# Patient Record
Sex: MALE | Race: WHITE | NOT HISPANIC OR LATINO | Employment: FULL TIME | URBAN - METROPOLITAN AREA
[De-identification: names, ages, dates, MRNs, and addresses within clinical notes are randomized per-mention and may not be internally consistent; named-entity substitution may affect disease eponyms.]

---

## 2019-04-22 ENCOUNTER — TRANSCRIBE ORDERS (OUTPATIENT)
Dept: ADMINISTRATIVE | Facility: HOSPITAL | Age: 64
End: 2019-04-22

## 2019-04-22 DIAGNOSIS — R31.9 HEMATURIA, UNSPECIFIED TYPE: Primary | ICD-10-CM

## 2019-05-04 ENCOUNTER — HOSPITAL ENCOUNTER (OUTPATIENT)
Dept: CT IMAGING | Facility: HOSPITAL | Age: 64
Discharge: HOME | End: 2019-05-04
Attending: UROLOGY
Payer: COMMERCIAL

## 2019-05-04 DIAGNOSIS — R31.9 HEMATURIA, UNSPECIFIED TYPE: ICD-10-CM

## 2019-05-04 PROCEDURE — 74178 CT ABD&PLV WO CNTR FLWD CNTR: CPT

## 2019-05-04 RX ADMIN — IOHEXOL 100 ML: 350 INJECTION, SOLUTION INTRAVENOUS at 15:31

## 2020-04-06 ENCOUNTER — APPOINTMENT (OUTPATIENT)
Dept: URGENT CARE | Facility: CLINIC | Age: 65
End: 2020-04-06

## 2020-04-10 ENCOUNTER — APPOINTMENT (OUTPATIENT)
Dept: URGENT CARE | Facility: CLINIC | Age: 65
End: 2020-04-10
Payer: OTHER MISCELLANEOUS

## 2020-04-10 PROCEDURE — 99213 OFFICE O/P EST LOW 20 MIN: CPT

## 2021-02-13 DIAGNOSIS — Z23 ENCOUNTER FOR IMMUNIZATION: ICD-10-CM

## 2021-02-25 ENCOUNTER — IMMUNIZATIONS (OUTPATIENT)
Dept: FAMILY MEDICINE CLINIC | Facility: HOSPITAL | Age: 66
End: 2021-02-25

## 2021-02-25 DIAGNOSIS — Z23 ENCOUNTER FOR IMMUNIZATION: Primary | ICD-10-CM

## 2021-02-25 PROCEDURE — 91300 SARS-COV-2 / COVID-19 MRNA VACCINE (PFIZER-BIONTECH) 30 MCG: CPT

## 2021-02-25 PROCEDURE — 0001A SARS-COV-2 / COVID-19 MRNA VACCINE (PFIZER-BIONTECH) 30 MCG: CPT

## 2021-03-16 ENCOUNTER — IMMUNIZATIONS (OUTPATIENT)
Dept: FAMILY MEDICINE CLINIC | Facility: HOSPITAL | Age: 66
End: 2021-03-16

## 2021-03-16 DIAGNOSIS — Z23 ENCOUNTER FOR IMMUNIZATION: Primary | ICD-10-CM

## 2021-03-16 PROCEDURE — 0002A SARS-COV-2 / COVID-19 MRNA VACCINE (PFIZER-BIONTECH) 30 MCG: CPT

## 2021-03-16 PROCEDURE — 91300 SARS-COV-2 / COVID-19 MRNA VACCINE (PFIZER-BIONTECH) 30 MCG: CPT

## 2021-10-09 ENCOUNTER — IMMUNIZATIONS (OUTPATIENT)
Dept: FAMILY MEDICINE CLINIC | Facility: HOSPITAL | Age: 66
End: 2021-10-09

## 2021-10-09 DIAGNOSIS — Z23 ENCOUNTER FOR IMMUNIZATION: Primary | ICD-10-CM

## 2021-10-09 PROCEDURE — 91300 SARS-COV-2 / COVID-19 MRNA VACCINE (PFIZER-BIONTECH) 30 MCG: CPT

## 2021-10-09 PROCEDURE — 0001A SARS-COV-2 / COVID-19 MRNA VACCINE (PFIZER-BIONTECH) 30 MCG: CPT

## 2022-01-27 ENCOUNTER — TELEPHONE (OUTPATIENT)
Dept: UROLOGY | Facility: AMBULATORY SURGERY CENTER | Age: 67
End: 2022-01-27

## 2022-01-27 NOTE — TELEPHONE ENCOUNTER
New Patient called to say someone left message from office asking if he had any labs  He stated he has copy of his labs from [address] [place] [address] and can bring them to his appointment  He is not able to get in touch with office since Dr Cheyenne Davis retired

## 2022-01-27 NOTE — TELEPHONE ENCOUNTER
Contacted patient, patient stated he will bring labs and CT results at time of appt  Patient has confirmed

## 2022-02-02 ENCOUNTER — OFFICE VISIT (OUTPATIENT)
Dept: UROLOGY | Facility: CLINIC | Age: 67
End: 2022-02-02
Payer: COMMERCIAL

## 2022-02-02 VITALS
HEART RATE: 67 BPM | HEIGHT: 66 IN | BODY MASS INDEX: 34.87 KG/M2 | SYSTOLIC BLOOD PRESSURE: 138 MMHG | OXYGEN SATURATION: 98 % | WEIGHT: 217 LBS | DIASTOLIC BLOOD PRESSURE: 86 MMHG

## 2022-02-02 DIAGNOSIS — Z12.5 SCREENING FOR PROSTATE CANCER: ICD-10-CM

## 2022-02-02 DIAGNOSIS — N40.1 BENIGN PROSTATIC HYPERPLASIA WITH URINARY FREQUENCY: ICD-10-CM

## 2022-02-02 DIAGNOSIS — R35.0 BENIGN PROSTATIC HYPERPLASIA WITH URINARY FREQUENCY: ICD-10-CM

## 2022-02-02 DIAGNOSIS — N40.0 ENLARGED PROSTATE: Primary | ICD-10-CM

## 2022-02-02 LAB
BACTERIA UR QL AUTO: ABNORMAL /HPF
BILIRUB UR QL STRIP: NEGATIVE
CLARITY UR: CLEAR
COLOR UR: YELLOW
GLUCOSE UR STRIP-MCNC: NEGATIVE MG/DL
HGB UR QL STRIP.AUTO: NEGATIVE
HYALINE CASTS #/AREA URNS LPF: ABNORMAL /LPF
KETONES UR STRIP-MCNC: NEGATIVE MG/DL
LEUKOCYTE ESTERASE UR QL STRIP: ABNORMAL
NITRITE UR QL STRIP: NEGATIVE
NON-SQ EPI CELLS URNS QL MICRO: ABNORMAL /HPF
PH UR STRIP.AUTO: 6 [PH]
POST-VOID RESIDUAL VOLUME, ML POC: 147 ML
PROT UR STRIP-MCNC: NEGATIVE MG/DL
RBC #/AREA URNS AUTO: ABNORMAL /HPF
SL AMB  POCT GLUCOSE, UA: NORMAL
SL AMB LEUKOCYTE ESTERASE,UA: NORMAL
SL AMB POCT BILIRUBIN,UA: NORMAL
SL AMB POCT BLOOD,UA: NORMAL
SL AMB POCT CLARITY,UA: CLEAR
SL AMB POCT COLOR,UA: YELLOW
SL AMB POCT KETONES,UA: NORMAL
SL AMB POCT NITRITE,UA: NORMAL
SL AMB POCT PH,UA: 5
SL AMB POCT SPECIFIC GRAVITY,UA: 1.02
SL AMB POCT URINE PROTEIN: NORMAL
SL AMB POCT UROBILINOGEN: 0.2
SP GR UR STRIP.AUTO: 1.02 (ref 1–1.03)
UROBILINOGEN UR QL STRIP.AUTO: 0.2 E.U./DL
WBC #/AREA URNS AUTO: ABNORMAL /HPF

## 2022-02-02 PROCEDURE — 51798 US URINE CAPACITY MEASURE: CPT | Performed by: PHYSICIAN ASSISTANT

## 2022-02-02 PROCEDURE — 81002 URINALYSIS NONAUTO W/O SCOPE: CPT | Performed by: PHYSICIAN ASSISTANT

## 2022-02-02 PROCEDURE — 81001 URINALYSIS AUTO W/SCOPE: CPT | Performed by: PHYSICIAN ASSISTANT

## 2022-02-02 PROCEDURE — 99203 OFFICE O/P NEW LOW 30 MIN: CPT | Performed by: PHYSICIAN ASSISTANT

## 2022-02-02 PROCEDURE — 87086 URINE CULTURE/COLONY COUNT: CPT | Performed by: PHYSICIAN ASSISTANT

## 2022-02-02 RX ORDER — LISINOPRIL AND HYDROCHLOROTHIAZIDE 20; 12.5 MG/1; MG/1
TABLET ORAL
COMMUNITY
Start: 2021-12-19

## 2022-02-02 RX ORDER — TAMSULOSIN HYDROCHLORIDE 0.4 MG/1
0.4 CAPSULE ORAL
Qty: 90 CAPSULE | Refills: 3 | Status: SHIPPED | OUTPATIENT
Start: 2022-02-02

## 2022-02-02 RX ORDER — SIMVASTATIN 20 MG
TABLET ORAL
COMMUNITY
Start: 2021-12-21

## 2022-02-02 RX ORDER — MONTELUKAST SODIUM 10 MG/1
TABLET ORAL
COMMUNITY
Start: 2022-01-11

## 2022-02-02 RX ORDER — TAMSULOSIN HYDROCHLORIDE 0.4 MG/1
CAPSULE ORAL
COMMUNITY
End: 2022-02-02 | Stop reason: SDUPTHER

## 2022-02-02 RX ORDER — OMEPRAZOLE 20 MG/1
CAPSULE, DELAYED RELEASE ORAL
COMMUNITY
Start: 2021-12-21

## 2022-02-02 NOTE — PROGRESS NOTES
1  Enlarged prostate  POCT Measure PVR    POCT urine dip   2  Benign prostatic hyperplasia with urinary frequency  tamsulosin (FLOMAX) 0 4 mg    Urinalysis with microscopic    Urine culture   3  Screening for prostate cancer  PSA, Total Screen       Assessment and plan:       1  BPH with lower urinary tract symptoms  -continue tamsulosin monotherapy  -we discussed either addition of 5 alpha reductase inhibitor versus cystoscopy with transrectal ultrasound in the future should his urinary symptoms worsen  Patient reports that he is comfortable and will continue with tamsulosin and conservative measures  Follow-up 1 year for re-evaluation  2  Prostate cancer screening  -normal digital rectal examination  - will obtain an updated PSA    Codie Hood PA-C      Chief Complaint     BPH    History of Present Illness     Dayne Cash is a 77 y o  male presenting today as a new patient for transfer care BPH with lower urinary tract symptoms  Patient previously followed by Dr Jevon Mccullough  Longstanding history of BPH with lower urinary tract symptoms  Has been on tamsulosin monotherapy for period of time  Has some mild lower urinary tract symptoms overall comfortable with his urination  Denies any dysuria, gross hematuria, incontinence, or infections  CT urogram (05/04/2019) a negative for any urinary tract abnormalities  Prostatomegaly was noted  Patient denies any history of  surgical manipulation or  malignancies  Urine dip leukocyte positive, nitrite and blood negative  PVR 147mL    AUA SYMPTOM SCORE      Most Recent Value   AUA SYMPTOM SCORE    How often have you had a sensation of not emptying your bladder completely after you finished urinating? 4   How often have you had to urinate again less than two hours after you finished urinating? 4   How often have you found you stopped and started again several times when you urinate?  4   How often have you found it difficult to postpone urination? 3   How often have you had a weak urinary stream? 5   How often have you had to push or strain to begin urination? 5   How many times did you most typically get up to urinate from the time you went to bed at night until the time you got up in the morning? 3   Quality of Life: If you were to spend the rest of your life with your urinary condition just the way it is now, how would you feel about that? 5   AUA SYMPTOM SCORE 28          Review of Systems     Review of Systems   Constitutional: Negative for activity change, appetite change, chills, diaphoresis, fatigue, fever and unexpected weight change  Respiratory: Negative for chest tightness and shortness of breath  Cardiovascular: Negative for chest pain, palpitations and leg swelling  Gastrointestinal: Negative for abdominal distention, abdominal pain, constipation, diarrhea, nausea and vomiting  Genitourinary: Negative for decreased urine volume, difficulty urinating, dysuria, enuresis, flank pain, frequency, genital sores, hematuria and urgency  Musculoskeletal: Negative for back pain, gait problem and myalgias  Skin: Negative for color change, pallor, rash and wound  Psychiatric/Behavioral: Negative for behavioral problems  The patient is not nervous/anxious  Allergies     No Known Allergies    Physical Exam     Physical Exam  Constitutional:       General: He is not in acute distress  Appearance: Normal appearance  He is normal weight  He is not ill-appearing, toxic-appearing or diaphoretic  HENT:      Head: Normocephalic and atraumatic  Eyes:      General:         Right eye: No discharge  Left eye: No discharge  Conjunctiva/sclera: Conjunctivae normal    Pulmonary:      Effort: Pulmonary effort is normal  No respiratory distress  Genitourinary:     Comments: Good rectal tone  Prostate 40g, smooth, symmetric, no palpable nodules  Musculoskeletal:         General: No swelling or tenderness   Normal range of motion  Skin:     General: Skin is warm and dry  Coloration: Skin is not jaundiced or pale  Neurological:      General: No focal deficit present  Mental Status: He is alert and oriented to person, place, and time  Psychiatric:         Mood and Affect: Mood normal          Behavior: Behavior normal          Thought Content: Thought content normal          Judgment: Judgment normal            Vital Signs     Vitals:    02/02/22 1002   BP: 138/86   Pulse: 67   SpO2: 98%   Weight: 98 4 kg (217 lb)   Height: 5' 6" (1 676 m)         Current Medications       Current Outpatient Medications:     lisinopril-hydrochlorothiazide (PRINZIDE,ZESTORETIC) 20-12 5 MG per tablet, , Disp: , Rfl:     montelukast (SINGULAIR) 10 mg tablet, , Disp: , Rfl:     omeprazole (PriLOSEC) 20 mg delayed release capsule, , Disp: , Rfl:     simvastatin (ZOCOR) 20 mg tablet, , Disp: , Rfl:     tamsulosin (FLOMAX) 0 4 mg, Take 1 capsule (0 4 mg total) by mouth daily with dinner, Disp: 90 capsule, Rfl: 3      Active Problems     There is no problem list on file for this patient  Past Medical History     Past Medical History:   Diagnosis Date    Hypertension     No pertinent past medical history          Surgical History     Past Surgical History:   Procedure Laterality Date    NO PAST SURGERIES      TONSILLECTOMY      when pt was 15yo         Family History     History reviewed  No pertinent family history        Social History     Social History       Radiology

## 2022-02-03 LAB — BACTERIA UR CULT: NORMAL

## 2022-02-15 ENCOUNTER — APPOINTMENT (OUTPATIENT)
Dept: LAB | Facility: AMBULARY SURGERY CENTER | Age: 67
End: 2022-02-15
Payer: COMMERCIAL

## 2022-02-15 DIAGNOSIS — Z12.5 SCREENING FOR PROSTATE CANCER: ICD-10-CM

## 2022-02-15 LAB — PSA SERPL-MCNC: 1.1 NG/ML (ref 0–4)

## 2022-02-15 PROCEDURE — G0103 PSA SCREENING: HCPCS

## 2022-02-15 PROCEDURE — 36415 COLL VENOUS BLD VENIPUNCTURE: CPT

## 2022-07-05 ENCOUNTER — OFFICE VISIT (OUTPATIENT)
Dept: UROLOGY | Facility: CLINIC | Age: 67
End: 2022-07-05
Payer: COMMERCIAL

## 2022-07-05 VITALS
WEIGHT: 198 LBS | SYSTOLIC BLOOD PRESSURE: 110 MMHG | HEART RATE: 82 BPM | DIASTOLIC BLOOD PRESSURE: 70 MMHG | HEIGHT: 66 IN | BODY MASS INDEX: 31.82 KG/M2 | OXYGEN SATURATION: 97 %

## 2022-07-05 DIAGNOSIS — N40.1 BENIGN PROSTATIC HYPERPLASIA WITH LOWER URINARY TRACT SYMPTOMS, SYMPTOM DETAILS UNSPECIFIED: Primary | ICD-10-CM

## 2022-07-05 PROCEDURE — 99213 OFFICE O/P EST LOW 20 MIN: CPT | Performed by: PHYSICIAN ASSISTANT

## 2022-07-05 NOTE — PROGRESS NOTES
No diagnosis found  Assessment and plan:       1  BPH with lower urinary tract symptoms  -continue tamsulosin monotherapy  - patient is interested in surgical workup of his lower urinary tract symptoms as he wishes for intervention prior route to correction  Will follow up for cystoscopy and transrectal ultrasound for bladder outlet obstruction workup  2  Prostate cancer screening  -normal digital rectal examination  - PSA 1 1 (2/15/22)    Vanda Monzon PA-C      Chief Complaint     BPH    History of Present Illness     Chanel Bruce is a 79 y o  male presenting today for f/u BPH with lower urinary tract symptoms  Patient previously followed by Dr Jai Ashby  Longstanding history of BPH with lower urinary tract symptoms  Has been on tamsulosin monotherapy for period of time  Has some mild lower urinary tract symptoms overall comfortable with his urination  Denies any dysuria, gross hematuria, incontinence, or infections  CT urogram (05/04/2019) a negative for any urinary tract abnormalities  Prostatomegaly was noted  Patient denies any history of  surgical manipulation or  malignancies  PSA 1 1 (2/15/22)    AUA SYMPTOM SCORE    Flowsheet Row Most Recent Value   AUA SYMPTOM SCORE    How often have you had a sensation of not emptying your bladder completely after you finished urinating? 0   How often have you had to urinate again less than two hours after you finished urinating? 2   How often have you found you stopped and started again several times when you urinate? 0   How often have you found it difficult to postpone urination? 0   How often have you had a weak urinary stream? 0   How often have you had to push or strain to begin urination? 0   How many times did you most typically get up to urinate from the time you went to bed at night until the time you got up in the morning?  2   Quality of Life: If you were to spend the rest of your life with your urinary condition just the way it is now, how would you feel about that? 2   AUA SYMPTOM SCORE 4            Review of Systems     Review of Systems   Constitutional: Negative for activity change, appetite change, chills, diaphoresis, fatigue, fever and unexpected weight change  Respiratory: Negative for chest tightness and shortness of breath  Cardiovascular: Negative for chest pain, palpitations and leg swelling  Gastrointestinal: Negative for abdominal distention, abdominal pain, constipation, diarrhea, nausea and vomiting  Genitourinary: Negative for decreased urine volume, difficulty urinating, dysuria, enuresis, flank pain, frequency, genital sores, hematuria and urgency  Musculoskeletal: Negative for back pain, gait problem and myalgias  Skin: Negative for color change, pallor, rash and wound  Psychiatric/Behavioral: Negative for behavioral problems  The patient is not nervous/anxious  Allergies     No Known Allergies    Physical Exam     Physical Exam  Constitutional:       General: He is not in acute distress  Appearance: Normal appearance  He is normal weight  He is not ill-appearing, toxic-appearing or diaphoretic  HENT:      Head: Normocephalic and atraumatic  Eyes:      General:         Right eye: No discharge  Left eye: No discharge  Conjunctiva/sclera: Conjunctivae normal    Pulmonary:      Effort: Pulmonary effort is normal  No respiratory distress  Genitourinary:     Comments: Good rectal tone  Prostate 40g, smooth, symmetric, no palpable nodules  Musculoskeletal:         General: No swelling or tenderness  Normal range of motion  Skin:     General: Skin is warm and dry  Coloration: Skin is not jaundiced or pale  Neurological:      General: No focal deficit present  Mental Status: He is alert and oriented to person, place, and time  Psychiatric:         Mood and Affect: Mood normal          Behavior: Behavior normal          Thought Content:  Thought content normal  Judgment: Judgment normal            Vital Signs     Vitals:    07/05/22 0721   BP: 110/70   BP Location: Left arm   Patient Position: Sitting   Cuff Size: Standard   Pulse: 82   SpO2: 97%   Weight: 89 8 kg (198 lb)   Height: 5' 6" (1 676 m)         Current Medications       Current Outpatient Medications:     lisinopril-hydrochlorothiazide (PRINZIDE,ZESTORETIC) 20-12 5 MG per tablet, , Disp: , Rfl:     montelukast (SINGULAIR) 10 mg tablet, , Disp: , Rfl:     omeprazole (PriLOSEC) 20 mg delayed release capsule, , Disp: , Rfl:     simvastatin (ZOCOR) 20 mg tablet, , Disp: , Rfl:     tamsulosin (FLOMAX) 0 4 mg, Take 1 capsule (0 4 mg total) by mouth daily with dinner, Disp: 90 capsule, Rfl: 3      Active Problems     There is no problem list on file for this patient  Past Medical History     Past Medical History:   Diagnosis Date    Hypertension     No pertinent past medical history          Surgical History     Past Surgical History:   Procedure Laterality Date    NO PAST SURGERIES      TONSILLECTOMY      when pt was 17yo         Family History     History reviewed  No pertinent family history        Social History     Social History       Radiology

## 2023-01-04 PROBLEM — N40.1 BENIGN PROSTATIC HYPERPLASIA WITH LOWER URINARY TRACT SYMPTOMS: Status: ACTIVE | Noted: 2023-01-04

## 2023-01-04 NOTE — PROGRESS NOTES
Cystoscopy     Date/Time 1/4/2023 8:29 AM     Performed by  Nina López MD     Authorized by Nina López MD          Procedure Details:  Procedure type: cystoscopy       Office Cystoscopy Procedure Note    Indication:    Medically refractory lower urinary tract symptoms    Informed consent   The risks, benefits, complications, treatment options, and expected outcomes were discussed with the patient  The patient concurred with the proposed plan and provided informed consent  Anesthesia  Lidocaine jelly 2%    Procedure  The patient was placed in the supineposition, was prepped and draped in the usual manner using sterile technique, and 2% lidocaine jelly instilled into the urethra  A 17 F flexible cystoscope was then inserted into the urethra and the urethra and bladder carefully examined  The following findings were noted:    Findings:  Urethra:  Normal  Prostate:  Pure by lobar hyperplasia  Prostatic fossa is high and moderate in length  However the bladder neck is not elevated there is no median lobe  Anatomy would be highly amenable to the UroLift  Bladder:  Normal  Ureteral orifices:  Normal  Other findings:  None       Office TRUS    Indication    Prostate volumetrics for surgical planning    Transrectal ultrasonography  After completing the cystoscopy, the patient was placed in the left lateral decubitus position  After an attentive digital rectal examination, a 7 5 mHz sidefire ultrasound probe was gently inserted into the rectum and biplanar imaging of the prostate was done with the findings noted below  Images were taken of any abnormal findings and also to document prostate size      Bladder  The bladder base appeared normal     Prostate      Ultrasound size measurements:  -Volume:  54 cm3    Ultrasound findings:  -Cysts: None  -Masses: None  -Median lobe: absent        Specimens:  sterile urine culture collected through cystoscope                 Complications:    None; patient tolerated the procedure well           Disposition: To home after 30 minute observation             Condition: Stable

## 2023-01-05 ENCOUNTER — PROCEDURE VISIT (OUTPATIENT)
Dept: UROLOGY | Facility: CLINIC | Age: 68
End: 2023-01-05

## 2023-01-05 VITALS
HEIGHT: 66 IN | BODY MASS INDEX: 33.14 KG/M2 | HEART RATE: 80 BPM | WEIGHT: 206.2 LBS | DIASTOLIC BLOOD PRESSURE: 82 MMHG | RESPIRATION RATE: 16 BRPM | SYSTOLIC BLOOD PRESSURE: 140 MMHG | OXYGEN SATURATION: 98 %

## 2023-01-05 DIAGNOSIS — N40.1 BENIGN PROSTATIC HYPERPLASIA WITH LOWER URINARY TRACT SYMPTOMS, SYMPTOM DETAILS UNSPECIFIED: Primary | ICD-10-CM

## 2023-01-05 DIAGNOSIS — R35.0 BENIGN PROSTATIC HYPERPLASIA WITH URINARY FREQUENCY: ICD-10-CM

## 2023-01-05 DIAGNOSIS — N40.1 BENIGN PROSTATIC HYPERPLASIA WITH URINARY FREQUENCY: ICD-10-CM

## 2023-01-05 LAB — POST-VOID RESIDUAL VOLUME, ML POC: 157 ML

## 2023-01-05 RX ORDER — TAMSULOSIN HYDROCHLORIDE 0.4 MG/1
0.4 CAPSULE ORAL
Qty: 90 CAPSULE | Refills: 3 | Status: SHIPPED | OUTPATIENT
Start: 2023-01-05

## 2023-01-05 RX ORDER — LISINOPRIL AND HYDROCHLOROTHIAZIDE 20; 12.5 MG/1; MG/1
TABLET ORAL
COMMUNITY

## 2023-01-05 RX ORDER — SIMVASTATIN 20 MG
TABLET ORAL
COMMUNITY

## 2023-01-05 NOTE — PROGRESS NOTES
Referring Physician: Blanca Xie MD  A copy of this note was sent to the referring physician  Diagnoses and all orders for this visit:    Benign prostatic hyperplasia with lower urinary tract symptoms, symptom details unspecified  -     Cystoscopy  -     POCT Measure PVR  -     Urine culture  -     Case request operating room: [address] [place] [address]; Standing  -     Case request operating room: CYSTOSCOPY WITH INSERTION UROLIFT    Benign prostatic hyperplasia with urinary frequency  -     tamsulosin (FLOMAX) 0 4 mg; Take 1 capsule (0 4 mg total) by mouth daily with dinner    Other orders  -     simvastatin (ZOCOR) 20 mg tablet;  (Patient not taking: Reported on 1/5/2023)  -     lisinopril-hydrochlorothiazide (PRINZIDE,ZESTORETIC) 20-12 5 MG per tablet;  (Patient not taking: Reported on 1/5/2023)  -     Diet NPO; Sips with meds; Standing  -     Place sequential compression device; Standing  -     ceFAZolin (ANCEF) 2,000 mg in dextrose 5 % 100 mL IVPB            Assessment and plan:       1  Refractory lower urinary tract symptoms  -Managed with tamsulosin for some time  -    2  Prostate Cancer screening  -normal digital rectal examination  - PSA 1 1 (2/15/22)    Cystoscopic findings reviewed with the patient in detail  We reviewed the options for treating BPH/LUTS which include but are not limited to expectant management, medical therapy, transurethral resection of prostate (TURP)  We also discussed minimally invasive options  Compared and contrasted the differential effectiveness in terms of AUA symptom score, symptom complex improvement, as well as the data regarding longevity of each surgical option  We also discussed the differential recovery time between each modality  At this point, the patient wishes to proceed with Urolift    This is a great option for the patient based on the following criteria:    - cystoscopy revealed bilobar hyperplasia  - estimated gland volume 54 g measured by transrectal ultrasound  - uroflow with max flow 4 mL/s  - PVR with 157  - AUA SS 5  - Bother index: 2  - normal renal function  - no active urinary tract infection  - PSA: 1 1  - no documented allergy to nickel    - He has failed the following treatment options: Tamsulosin  The risks of Urolift include but are not limited to bleeding, infection, reaction to anesthesia such as heart attack, stroke, DVT/PE, hyponatremia, bladder neck contracture, urethral stricture, injury to surrounding structures (ureters, rectum, etc), complications from implants including capsular tap perforation, development of bladder calculi  We discussed that additional risks of trans urethral resection procedures such as incontinence, retrograde ejaculation, and erectile dysfunction have been only rarely reported with the Uro lift procedure  We did discuss that this is a new were procedure and a permanent implant  We discussed that there may be some long-term implications that her on for seen with this newer technology, such as perhaps complicating treatment for prostate cancer if indicated down the line  Finally, I told him that he may require additional procedures secondary to some of these complications  Informed consent was obtained for the Uro lift procedure  This will be scheduled in the near future to be performed under IV sedation  Claus Chambers MD      Chief Complaint     BPH work-up      History of Present Illness     Richie Menjivar is a 79 y o  is a follow-up for BPH work-up  He is motivated to explore alternatives to ongoing tamsulosin for his BPH    Detailed Urologic History     - please refer to HPI    Review of Systems     Review of Systems   Constitutional: Negative for activity change and fatigue  HENT: Negative for congestion  Eyes: Negative for visual disturbance  Respiratory: Negative for shortness of breath and wheezing  Cardiovascular: Negative for chest pain and leg swelling  Gastrointestinal: Negative for abdominal pain  Endocrine: Negative for polyuria  Genitourinary: Negative for dysuria, flank pain, hematuria and urgency  Musculoskeletal: Negative for back pain  Allergic/Immunologic: Negative for immunocompromised state  Neurological: Negative for dizziness and numbness  Psychiatric/Behavioral: Negative for dysphoric mood  All other systems reviewed and are negative  AUA SYMPTOM SCORE    Flowsheet Row Most Recent Value   AUA SYMPTOM SCORE    How often have you had a sensation of not emptying your bladder completely after you finished urinating? 1 (P)     How often have you had to urinate again less than two hours after you finished urinating? 1 (P)     How often have you found you stopped and started again several times when you urinate? 0 (P)     How often have you found it difficult to postpone urination? 0 (P)     How often have you had a weak urinary stream? 1 (P)     How often have you had to push or strain to begin urination? 0 (P)     How many times did you most typically get up to urinate from the time you went to bed at night until the time you got up in the morning? 2 (P)     Quality of Life: If you were to spend the rest of your life with your urinary condition just the way it is now, how would you feel about that? 2 (P)     AUA SYMPTOM SCORE 5 (P)             Allergies     No Known Allergies    Physical Exam       Physical Exam  Constitutional:       General: He is not in acute distress  Appearance: He is well-developed  HENT:      Head: Normocephalic and atraumatic  Cardiovascular:      Comments: Negative lower extremity edema  Pulmonary:      Effort: Pulmonary effort is normal       Breath sounds: Normal breath sounds  Abdominal:      Palpations: Abdomen is soft  Musculoskeletal:         General: Normal range of motion  Cervical back: Normal range of motion  Skin:     General: Skin is warm     Neurological:      Mental Status: He is alert and oriented to person, place, and time  Psychiatric:         Behavior: Behavior normal            Vital Signs  There were no vitals filed for this visit  Current Medications       Current Outpatient Medications:   •  lisinopril-hydrochlorothiazide (PRINZIDE,ZESTORETIC) 20-12 5 MG per tablet, , Disp: , Rfl:   •  montelukast (SINGULAIR) 10 mg tablet, , Disp: , Rfl:   •  omeprazole (PriLOSEC) 20 mg delayed release capsule, , Disp: , Rfl:   •  simvastatin (ZOCOR) 20 mg tablet, , Disp: , Rfl:   •  tamsulosin (FLOMAX) 0 4 mg, Take 1 capsule (0 4 mg total) by mouth daily with dinner, Disp: 90 capsule, Rfl: 3      Active Problems     Patient Active Problem List   Diagnosis   • Benign prostatic hyperplasia with lower urinary tract symptoms         Past Medical History     Past Medical History:   Diagnosis Date   • Hypertension    • No pertinent past medical history          Surgical History     Past Surgical History:   Procedure Laterality Date   • NO PAST SURGERIES     • TONSILLECTOMY      when pt was 17yo         Family History     No family history on file  Social History     Social History     Social History     Tobacco Use   Smoking Status Former   Smokeless Tobacco Never   Tobacco Comments    quit 30 years ago          Pertinent Lab Values     No results found for: CREATININE    Lab Results   Component Value Date    PSA 1 1 02/15/2022       @RESULTRCNT(1H])@      Pertinent Imaging      - n/a    Portions of the record may have been created with voice recognition software  Occasional wrong word or "sound a like" substitutions may have occurred due to the inherent limitations of voice recognition software  Read the chart carefully and recognize, using context, where substitutions have occurred

## 2023-01-06 LAB — BACTERIA UR CULT: NORMAL

## 2023-01-10 ENCOUNTER — TELEPHONE (OUTPATIENT)
Dept: UROLOGY | Facility: CLINIC | Age: 68
End: 2023-01-10

## 2023-01-11 ENCOUNTER — PREP FOR PROCEDURE (OUTPATIENT)
Dept: UROLOGY | Facility: CLINIC | Age: 68
End: 2023-01-11

## 2023-01-11 DIAGNOSIS — N40.1 BENIGN PROSTATIC HYPERPLASIA WITH LOWER URINARY TRACT SYMPTOMS, SYMPTOM DETAILS UNSPECIFIED: Primary | ICD-10-CM

## 2023-01-11 NOTE — TELEPHONE ENCOUNTER
Spoke w patients wife and he is sched for 2/24 at the [facility]  She is aware she will drive him and hospital will contact them day prior w time of arrival  He will go for UCX and EKG 2-3 wks prior and advised 7 day hold of asprin products, multivitamins and fish oils  We will mail them a surgical packet and they will contact us w any questions or concerns

## 2023-01-16 ENCOUNTER — TELEPHONE (OUTPATIENT)
Dept: OTHER | Facility: OTHER | Age: 68
End: 2023-01-16

## 2023-01-16 ENCOUNTER — NURSE TRIAGE (OUTPATIENT)
Dept: OTHER | Facility: OTHER | Age: 68
End: 2023-01-16

## 2023-01-16 DIAGNOSIS — N40.1 BPH WITH OBSTRUCTION/LOWER URINARY TRACT SYMPTOMS: Primary | ICD-10-CM

## 2023-01-16 DIAGNOSIS — N13.8 BPH WITH OBSTRUCTION/LOWER URINARY TRACT SYMPTOMS: Primary | ICD-10-CM

## 2023-01-16 NOTE — TELEPHONE ENCOUNTER
Patient called in to report hesitancy and pain to start stream; has a weak stream, and he doesn't feel he is emptying his bladder  Patient called to request increasing Tamsulosin to two capsules per day as advised by previous provider before custodial  Patient scheduled for procedure 2/24/2023  Please follow up with patient after 10 AM    Reason for Disposition  • Caller has NON-URGENT medicine question about med that PCP or specialist prescribed and triager unable to answer question    Answer Assessment - Initial Assessment Questions  1  NAME of MEDICATION: "What medicine are you calling about?"      Tamsulosin 0 4 mg PO daily  2  QUESTION: "What is your question?" (e g , medication refill, side effect)      More difficulty urinating for the past week;  3  PRESCRIBING HCP: "Who prescribed it?" Reason: if prescribed by specialist, call should be referred to that group  Urology  4  SYMPTOMS: "Do you have any symptoms?"      Difficulty and painful to start his stream; straining; weak stream and doesn't feel he is emptying his bladder  5   SEVERITY: If symptoms are present, ask "Are they mild, moderate or severe?"      moderate    Protocols used: MEDICATION QUESTION CALL-ADULT-[place]

## 2023-01-16 NOTE — TELEPHONE ENCOUNTER
Regarding: Urology medication question  ----- Message from Dari Mansfield RN sent at 1/16/2023  5:05 PM EST -----  "I am taking flomax; I was told that I could take 2 tablets but have only been taking one  It was working well, but now for the past week I have had more difficulty urinating   Should I take another tablet?"

## 2023-01-17 NOTE — TELEPHONE ENCOUNTER
Returned call to patient  was last in our office on 1/5/23 for cystoscopy and is scheduled  for urolift on 2/24/23 with Dr Gilbert Garcias   Patient reports and started with difficulty with urination  + frequency, urgency no burning  Patient states , has to push to void  once he starts stream is okay  Orders place for urine testing  Patient will go today  Offered a nurse visit for pvr patient state he does not feel he is at that point yet  Advised to contact our office if symptoms worsen  Patient verbalized understanding       Patient is also requesting to double his flomax dose to 0 8 was advised per patient to do so in the past     Will send message to provider

## 2023-01-17 NOTE — TELEPHONE ENCOUNTER
Spoke with patient and relayed provider's message  He verbalized understanding and agrees to plan  Office to monitor for urine results

## 2023-01-17 NOTE — TELEPHONE ENCOUNTER
Elvi Butler PA-C  [facility] 2 hours ago (8:15 AM)     BU  Agree with urine testing and PVR if symptoms worsen   Can trial double dose Flomax as he has done this in the past   If he develops side effects of medication, would recommend decreasing to single dose Flomax   Thank you      Left message for patient requesting call back

## 2023-01-18 ENCOUNTER — APPOINTMENT (OUTPATIENT)
Dept: LAB | Facility: AMBULARY SURGERY CENTER | Age: 68
End: 2023-01-18
Attending: UROLOGY

## 2023-01-18 DIAGNOSIS — N40.1 BPH WITH OBSTRUCTION/LOWER URINARY TRACT SYMPTOMS: ICD-10-CM

## 2023-01-18 DIAGNOSIS — N13.8 BPH WITH OBSTRUCTION/LOWER URINARY TRACT SYMPTOMS: ICD-10-CM

## 2023-01-18 LAB
BACTERIA UR QL AUTO: ABNORMAL /HPF
BILIRUB UR QL STRIP: NEGATIVE
CLARITY UR: CLEAR
COLOR UR: ABNORMAL
GLUCOSE UR STRIP-MCNC: NEGATIVE MG/DL
HGB UR QL STRIP.AUTO: NEGATIVE
KETONES UR STRIP-MCNC: NEGATIVE MG/DL
LEUKOCYTE ESTERASE UR QL STRIP: ABNORMAL
MUCOUS THREADS UR QL AUTO: ABNORMAL
NITRITE UR QL STRIP: NEGATIVE
NON-SQ EPI CELLS URNS QL MICRO: ABNORMAL /HPF
PH UR STRIP.AUTO: 6 [PH]
PROT UR STRIP-MCNC: ABNORMAL MG/DL
RBC #/AREA URNS AUTO: ABNORMAL /HPF
SP GR UR STRIP.AUTO: 1.02 (ref 1–1.03)
UROBILINOGEN UR STRIP-ACNC: <2 MG/DL
WBC #/AREA URNS AUTO: ABNORMAL /HPF

## 2023-01-20 DIAGNOSIS — N39.0 URINARY TRACT INFECTION WITHOUT HEMATURIA, SITE UNSPECIFIED: Primary | ICD-10-CM

## 2023-01-20 LAB — BACTERIA UR CULT: NORMAL

## 2023-01-20 RX ORDER — SULFAMETHOXAZOLE AND TRIMETHOPRIM 800; 160 MG/1; MG/1
1 TABLET ORAL EVERY 12 HOURS SCHEDULED
Qty: 6 TABLET | Refills: 0 | Status: SHIPPED | OUTPATIENT
Start: 2023-01-20 | End: 2023-01-23

## 2023-02-02 ENCOUNTER — TELEPHONE (OUTPATIENT)
Dept: UROLOGY | Facility: MEDICAL CENTER | Age: 68
End: 2023-02-02

## 2023-02-02 NOTE — TELEPHONE ENCOUNTER
DOS 23 Procedure 89057/12755// no auth required M#[id number]/ no auth required formerly [facility]#[id number]   And  no auth required ref# [id number]

## 2023-02-09 ENCOUNTER — APPOINTMENT (OUTPATIENT)
Dept: LAB | Facility: AMBULARY SURGERY CENTER | Age: 68
End: 2023-02-09
Attending: UROLOGY

## 2023-02-09 ENCOUNTER — OFFICE VISIT (OUTPATIENT)
Dept: LAB | Facility: CLINIC | Age: 68
End: 2023-02-09

## 2023-02-09 DIAGNOSIS — N40.1 BENIGN PROSTATIC HYPERPLASIA WITH LOWER URINARY TRACT SYMPTOMS, SYMPTOM DETAILS UNSPECIFIED: ICD-10-CM

## 2023-02-09 LAB
ATRIAL RATE: 73 BPM
P AXIS: 50 DEGREES
PR INTERVAL: 144 MS
QRS AXIS: 9 DEGREES
QRSD INTERVAL: 82 MS
QT INTERVAL: 364 MS
QTC INTERVAL: 401 MS
T WAVE AXIS: 27 DEGREES
VENTRICULAR RATE: 73 BPM

## 2023-02-12 ENCOUNTER — TELEPHONE (OUTPATIENT)
Dept: UROLOGY | Facility: CLINIC | Age: 68
End: 2023-02-12

## 2023-02-12 DIAGNOSIS — N39.0 URINARY TRACT INFECTION WITHOUT HEMATURIA, SITE UNSPECIFIED: Primary | ICD-10-CM

## 2023-02-12 LAB — BACTERIA UR CULT: ABNORMAL

## 2023-02-12 RX ORDER — CEPHALEXIN 500 MG/1
500 CAPSULE ORAL EVERY 12 HOURS SCHEDULED
Qty: 10 CAPSULE | Refills: 0 | Status: SHIPPED | OUTPATIENT
Start: 2023-02-12 | End: 2023-02-13 | Stop reason: ALTCHOICE

## 2023-02-13 RX ORDER — LEVOFLOXACIN 500 MG/1
500 TABLET, FILM COATED ORAL EVERY 24 HOURS
Qty: 7 TABLET | Refills: 0 | Status: SHIPPED | OUTPATIENT
Start: 2023-02-13 | End: 2023-02-21

## 2023-02-13 NOTE — TELEPHONE ENCOUNTER
Called and left VM for patient  Office number was left in VM  When patient calls back please advise levaquin abx was sent to pharmacy  Patient to stop taking keflex if that was picked up and to start levaquin  Thank you

## 2023-02-14 ENCOUNTER — ANESTHESIA EVENT (OUTPATIENT)
Dept: PERIOP | Facility: AMBULARY SURGERY CENTER | Age: 68
End: 2023-02-14

## 2023-02-14 NOTE — TELEPHONE ENCOUNTER
Spoke with patient  Already picked up levaquin and is taking only that, not the keflex   No further questions

## 2023-02-21 NOTE — PRE-PROCEDURE INSTRUCTIONS
Pre-Surgery Instructions:   Medication Instructions   • lisinopril-hydrochlorothiazide (PRINZIDE,ZESTORETIC) 20-12 5 MG per tablet Hold day of surgery  • omeprazole (PriLOSEC) 20 mg delayed release capsule Take day of surgery  • simvastatin (ZOCOR) 20 mg tablet Take night before surgery   • tamsulosin (FLOMAX) 0 4 mg Take night before surgery    Med list reviewed as above  Also instructed pt not to start any new vitamins/supplements preoperatively and to avoid NSAID's  3 days prior to surgery  Tylenol is acceptable if needed  Pt has and/or is getting CHG surgical soap and verbalizes understanding of preoperative showering protocol  All information within "My Surgical Experience" pamphlet reviewed and patient verbalizes understanding and compliance  All questions answered  Instructed pt to call surgeon's office with any questions, new illness, or hospitalization prior to surgery  Pt verbalized understanding

## 2023-02-21 NOTE — TELEPHONE ENCOUNTER
Patient wants to know if he needs to have another urine culture test done prior to his procedure on 2/24/23

## 2023-02-24 ENCOUNTER — TELEPHONE (OUTPATIENT)
Dept: UROLOGY | Facility: CLINIC | Age: 68
End: 2023-02-24

## 2023-02-24 ENCOUNTER — ANESTHESIA (OUTPATIENT)
Dept: PERIOP | Facility: AMBULARY SURGERY CENTER | Age: 68
End: 2023-02-24

## 2023-02-24 ENCOUNTER — HOSPITAL ENCOUNTER (OUTPATIENT)
Facility: AMBULARY SURGERY CENTER | Age: 68
Setting detail: OUTPATIENT SURGERY
Discharge: HOME | End: 2023-02-24
Attending: UROLOGY | Admitting: UROLOGY

## 2023-02-24 VITALS
OXYGEN SATURATION: 98 % | HEIGHT: 66 IN | SYSTOLIC BLOOD PRESSURE: 129 MMHG | HEART RATE: 74 BPM | WEIGHT: 197 LBS | DIASTOLIC BLOOD PRESSURE: 75 MMHG | RESPIRATION RATE: 16 BRPM | TEMPERATURE: 97.2 F | BODY MASS INDEX: 31.66 KG/M2

## 2023-02-24 DIAGNOSIS — N40.1 BENIGN PROSTATIC HYPERPLASIA WITH LOWER URINARY TRACT SYMPTOMS, SYMPTOM DETAILS UNSPECIFIED: Primary | ICD-10-CM

## 2023-02-24 DEVICE — IMPLANT CARTRIDGE UROLIFT 2 HANDLE KIT: Type: IMPLANTABLE DEVICE | Site: PROSTATE | Status: FUNCTIONAL

## 2023-02-24 DEVICE — IMPLANT CARTRIDGE UROLIFT 2: Type: IMPLANTABLE DEVICE | Site: PROSTATE | Status: FUNCTIONAL

## 2023-02-24 DEVICE — IMPLANT UROLIFT ATC SYSTEM: Type: IMPLANTABLE DEVICE | Site: PROSTATE | Status: FUNCTIONAL

## 2023-02-24 RX ORDER — LIDOCAINE HYDROCHLORIDE 10 MG/ML
INJECTION, SOLUTION EPIDURAL; INFILTRATION; INTRACAUDAL; PERINEURAL AS NEEDED
Status: DISCONTINUED | OUTPATIENT
Start: 2023-02-24 | End: 2023-02-24

## 2023-02-24 RX ORDER — FUROSEMIDE 10 MG/ML
INJECTION INTRAMUSCULAR; INTRAVENOUS AS NEEDED
Status: DISCONTINUED | OUTPATIENT
Start: 2023-02-24 | End: 2023-02-24

## 2023-02-24 RX ORDER — PROPOFOL 10 MG/ML
INJECTION, EMULSION INTRAVENOUS AS NEEDED
Status: DISCONTINUED | OUTPATIENT
Start: 2023-02-24 | End: 2023-02-24

## 2023-02-24 RX ORDER — FENTANYL CITRATE 50 UG/ML
INJECTION, SOLUTION INTRAMUSCULAR; INTRAVENOUS AS NEEDED
Status: DISCONTINUED | OUTPATIENT
Start: 2023-02-24 | End: 2023-02-24

## 2023-02-24 RX ORDER — PHENAZOPYRIDINE HYDROCHLORIDE 200 MG/1
200 TABLET, FILM COATED ORAL 3 TIMES DAILY PRN
Qty: 10 TABLET | Refills: 0 | Status: SHIPPED | OUTPATIENT
Start: 2023-02-24 | End: 2023-02-27

## 2023-02-24 RX ORDER — PROPOFOL 10 MG/ML
INJECTION, EMULSION INTRAVENOUS CONTINUOUS PRN
Status: DISCONTINUED | OUTPATIENT
Start: 2023-02-24 | End: 2023-02-24

## 2023-02-24 RX ORDER — ONDANSETRON 2 MG/ML
4 INJECTION INTRAMUSCULAR; INTRAVENOUS ONCE AS NEEDED
Status: DISCONTINUED | OUTPATIENT
Start: 2023-02-24 | End: 2023-02-24 | Stop reason: HOSPADM

## 2023-02-24 RX ORDER — MIDAZOLAM HYDROCHLORIDE 2 MG/2ML
INJECTION, SOLUTION INTRAMUSCULAR; INTRAVENOUS AS NEEDED
Status: DISCONTINUED | OUTPATIENT
Start: 2023-02-24 | End: 2023-02-24

## 2023-02-24 RX ORDER — CEFAZOLIN SODIUM 2 G/50ML
2000 SOLUTION INTRAVENOUS ONCE
Status: COMPLETED | OUTPATIENT
Start: 2023-02-24 | End: 2023-02-24

## 2023-02-24 RX ORDER — DOCUSATE SODIUM 100 MG/1
100 CAPSULE, LIQUID FILLED ORAL 2 TIMES DAILY
Qty: 30 CAPSULE | Refills: 0 | Status: SHIPPED | OUTPATIENT
Start: 2023-02-24 | End: 2023-03-11

## 2023-02-24 RX ORDER — SODIUM CHLORIDE, SODIUM LACTATE, POTASSIUM CHLORIDE, CALCIUM CHLORIDE 600; 310; 30; 20 MG/100ML; MG/100ML; MG/100ML; MG/100ML
INJECTION, SOLUTION INTRAVENOUS CONTINUOUS PRN
Status: DISCONTINUED | OUTPATIENT
Start: 2023-02-24 | End: 2023-02-24

## 2023-02-24 RX ORDER — SODIUM CHLORIDE, SODIUM LACTATE, POTASSIUM CHLORIDE, CALCIUM CHLORIDE 600; 310; 30; 20 MG/100ML; MG/100ML; MG/100ML; MG/100ML
75 INJECTION, SOLUTION INTRAVENOUS CONTINUOUS
Status: DISCONTINUED | OUTPATIENT
Start: 2023-02-24 | End: 2023-02-24 | Stop reason: HOSPADM

## 2023-02-24 RX ORDER — MAGNESIUM HYDROXIDE 1200 MG/15ML
LIQUID ORAL AS NEEDED
Status: DISCONTINUED | OUTPATIENT
Start: 2023-02-24 | End: 2023-02-24 | Stop reason: HOSPADM

## 2023-02-24 RX ORDER — OXYCODONE HYDROCHLORIDE 5 MG/1
5 TABLET ORAL EVERY 4 HOURS PRN
Status: DISCONTINUED | OUTPATIENT
Start: 2023-02-24 | End: 2023-02-24 | Stop reason: HOSPADM

## 2023-02-24 RX ORDER — CEPHALEXIN 500 MG/1
500 CAPSULE ORAL EVERY 6 HOURS SCHEDULED
Qty: 3 CAPSULE | Refills: 0 | Status: SHIPPED | OUTPATIENT
Start: 2023-02-24 | End: 2023-02-25

## 2023-02-24 RX ORDER — FENTANYL CITRATE/PF 50 MCG/ML
25 SYRINGE (ML) INJECTION
Status: DISCONTINUED | OUTPATIENT
Start: 2023-02-24 | End: 2023-02-24 | Stop reason: HOSPADM

## 2023-02-24 RX ORDER — ACETAMINOPHEN 325 MG/1
650 TABLET ORAL EVERY 4 HOURS PRN
Qty: 30 TABLET | Refills: 0
Start: 2023-02-24

## 2023-02-24 RX ORDER — EPHEDRINE SULFATE 50 MG/ML
INJECTION INTRAVENOUS AS NEEDED
Status: DISCONTINUED | OUTPATIENT
Start: 2023-02-24 | End: 2023-02-24

## 2023-02-24 RX ADMIN — FENTANYL CITRATE 25 MCG: 50 INJECTION, SOLUTION INTRAMUSCULAR; INTRAVENOUS at 08:28

## 2023-02-24 RX ADMIN — PROPOFOL 100 MCG/KG/MIN: 10 INJECTION, EMULSION INTRAVENOUS at 08:09

## 2023-02-24 RX ADMIN — MIDAZOLAM HYDROCHLORIDE 2 MG: 1 INJECTION, SOLUTION INTRAMUSCULAR; INTRAVENOUS at 08:06

## 2023-02-24 RX ADMIN — FENTANYL CITRATE 50 MCG: 50 INJECTION, SOLUTION INTRAMUSCULAR; INTRAVENOUS at 08:09

## 2023-02-24 RX ADMIN — FUROSEMIDE 20 MG: 10 INJECTION, SOLUTION INTRAMUSCULAR; INTRAVENOUS at 08:28

## 2023-02-24 RX ADMIN — SODIUM CHLORIDE, SODIUM LACTATE, POTASSIUM CHLORIDE, AND CALCIUM CHLORIDE: .6; .31; .03; .02 INJECTION, SOLUTION INTRAVENOUS at 08:06

## 2023-02-24 RX ADMIN — LIDOCAINE HYDROCHLORIDE 50 MG: 10 INJECTION, SOLUTION EPIDURAL; INFILTRATION; INTRACAUDAL; PERINEURAL at 08:09

## 2023-02-24 RX ADMIN — CEFAZOLIN SODIUM 2000 MG: 2 SOLUTION INTRAVENOUS at 08:06

## 2023-02-24 RX ADMIN — EPHEDRINE SULFATE 10 MG: 50 INJECTION, SOLUTION INTRAVENOUS at 08:17

## 2023-02-24 RX ADMIN — FENTANYL CITRATE 25 MCG: 50 INJECTION, SOLUTION INTRAMUSCULAR; INTRAVENOUS at 08:20

## 2023-02-24 RX ADMIN — PROPOFOL 60 MG: 10 INJECTION, EMULSION INTRAVENOUS at 08:09

## 2023-02-24 NOTE — TELEPHONE ENCOUNTER
Patient feeling well overall  No questions on catheter care or emptying the bag  Says he is comfortable taking cath out by himself tomorrow  Went over when to call the office and ER precautions  He reports no medication questions       Will call Monday to ensure he is urinating well

## 2023-02-24 NOTE — TELEPHONE ENCOUNTER
Post Op Note    Margaux Weber is a 79 y o  male s/p urolift performed 2/24/23  Margaux Weber is a patient of Dr Pratik Mcgill and is seen at the [facility] office       Called and left VM requesting to call back before the weekend

## 2023-02-24 NOTE — H&P
UROLOGY HISTORY AND PHYSICAL     Patient Identifiers: Argelia Sawyre (MRN [id number])      Date of Service: 2023        ASSESSMENT:     79 y o  old male with  BPH  PLAN:     UroLift      History of Present Illness:     Argelia Sawyer is a 79 y o  old with a history of BPH    Past Medical, Past Surgical History:     Past Medical History:   Diagnosis Date   • BPH (benign prostatic hyperplasia)    • GERD (gastroesophageal reflux disease)    • Hyperlipidemia    • Hypertension    :    Past Surgical History:   Procedure Laterality Date   • COLONOSCOPY     • CYSTOSCOPY     • TONSILLECTOMY      when pt was 11yo   :    Medications, Allergies:     Current Facility-Administered Medications:   •  ceFAZolin (ANCEF) IVPB (premix in dextrose) 2,000 mg 50 mL, 2,000 mg, Intravenous, Once, Gerry Maria MD    Allergies:  No Known Allergies:    Social and Family History:   Social History:   Social History     Tobacco Use   • Smoking status: Former     Types: Cigarettes     Quit date:      Years since quittin 1   • Smokeless tobacco: Never   • Tobacco comments:         Vaping Use   • Vaping Use: Never used   Substance Use Topics   • Alcohol use: Never   • Drug use: Never     Social History     Tobacco Use   Smoking Status Former   • Types: Cigarettes   • Quit date:    • Years since quittin 1   Smokeless Tobacco Never   Tobacco Comments            Family History:  History reviewed  No pertinent family history :     Review of Systems:     General: Fever, chills, or night sweats: negative  Cardiac: Negative for chest pain  Pulmonary: Negative for shortness of breath  Gastrointestinal: Abdominal pain negative  Nausea, vomiting, or diarrhea negative  Genitourinary: See HPI above  Patient does nothave hematuria  All other systems queried were negative  Physical Exam:   General: Patient is pleasant and in NAD   Awake and alert  /71   Pulse 74   Temp (!) 97 °F (36 1 °C) (Temporal)   Resp 18 Ht 5' 6" (1 676 m)   Wt 89 4 kg (197 lb)   SpO2 98%   BMI 31 80 kg/m²   HEENT:  Normocephalic atraumatic  Cardiac:  Regular rate and rhythm, Peripheral edema: negative  Pulmonary: Non-labored breathing, CTAB  Abdomen: Soft, non-tender, non-distended  No surgical scars  No masses, tenderness, hernias noted  Genitourinary: negative CVA tenderness, neg suprapubic tenderness  Extremities: normal movement in all 4       Labs:   No results found for: HGB, HCT, WBC, PLT]    No results found for: NA, K, CL, CO2, BUN, CREATININE, CALCIUM, GLUCOSE]    Imaging:   I personally reviewed the images and report of the following studies, and reviewed them with the patient:        Thank you for allowing me to participate in this patients’ care  Please do not hesitate to call with any additional questions    Earline Butler MD

## 2023-02-24 NOTE — ANESTHESIA PREPROCEDURE EVALUATION
Procedure:  CYSTOSCOPY WITH INSERTION UROLIFT (Urethra)    Relevant Problems   /RENAL   (+) Benign prostatic hyperplasia with lower urinary tract symptoms        Physical Exam    Airway    Mallampati score: II  TM Distance: >3 FB  Neck ROM: full     Dental   No notable dental hx     Cardiovascular  Cardiovascular exam normal    Pulmonary  Pulmonary exam normal     Other Findings        Anesthesia Plan  ASA Score- 2     Anesthesia Type- IV sedation with anesthesia with ASA Monitors  Additional Monitors:   Airway Plan:           Plan Factors-Exercise tolerance (METS): >4 METS  Chart reviewed  EKG reviewed  Imaging results reviewed  Existing labs reviewed  Patient summary reviewed  Patient is not a current smoker  Patient not instructed to abstain from smoking on day of procedure  Patient did not smoke on day of surgery  Obstructive sleep apnea risk education given perioperatively  Induction-     Postoperative Plan-     Informed Consent- Anesthetic plan and risks discussed with patient  I personally reviewed this patient with the CRNA  Discussed and agreed on the Anesthesia Plan with the CRNA  Stephanie Coronado

## 2023-02-24 NOTE — ANESTHESIA POSTPROCEDURE EVALUATION
Post-Op Assessment Note    CV Status:  Stable  Pain Score: 0    Pain management: adequate     Mental Status:  Alert and awake   Hydration Status:  Euvolemic   PONV Controlled:  Controlled   Airway Patency:  Patent   Two or more mitigation strategies used for obstructive sleep apnea   Post Op Vitals Reviewed: Yes      Staff: Anesthesiologist, CRNA         No notable events documented      BP   91/54   Temp  97 2   Pulse  78   Resp   16   SpO2   98

## 2023-02-24 NOTE — DISCHARGE INSTR - AVS FIRST PAGE
Mr Shelia Keita:    Your surgery went very well  I was able to open a nice, wide, more natural channel within your prostate by placing 7 Uro lift implants  Please take your medications as needed for discomfort over the next few days  Most importantly please drink 6-8 glasses water per day  He is remove your Javier catheter at home tomorrow morning following the directions below  If you you are unable to urinate by lunchtime, please call our office number listed below  Please call with any questions or concerns  Mazin Heart MD,PhD  [address] [facility]  [phone]          UROLIFT      WHAT YOU NEED TO KNOW:   A UroLift is procedure that is done to open the natural channel within your prostate gland  DISCHARGE INSTRUCTIONS:   Medicines:   Pain medicine: You may be given a prescription medicine to decrease pain  Do not wait until the pain is severe before you take these medicines:  Tylenol (over the counter) - please take this as directed on the bottle for routine pain  Naproxen (prescription-strength NSAID/Ibuprofen type medicine) - for moderate pain  This can be substituted with over the counter Ibuprofen if more convenient, or it this is less expensive  Pyridium - to minimize pain and discomfort when passing your urine  Will turn your urine orange  This can be substituted with over the counter "AZO" if more convenient, or it this is less expensive  Colace - to prevent constipation  This is also an over the counter medicine - you can purchase it without a prescription, if more convenient or less expensive  If your pain is not well controlled using Tylenol, Naprosyn/ibuprofen please do not hesitate to call our office, you will be connected to our care team day or night and you can be issued a prescription for a narcotic pain medication    Antibiotics:  You may be provided with antibiotics at discharge, particularly if you are being sent home with a javier catheter  Lorelei Yu This medicine is given to fight or prevent an infection caused by bacteria  Always take your antibiotics exactly as ordered by your healthcare provider  Do not stop taking your medicine unless directed by your healthcare provider  Never save antibiotics or take leftover antibiotics that were given to you for another illness  Take your medicine as directed  Contact your healthcare provider if you think your medicine is not helping or if you have side effects  Tell him or her if you are allergic to any medicine  Keep a list of the medicines, vitamins, and herbs you take  Include the amounts, and when and why you take them  Bring the list or the pill bottles to follow-up visits  Carry your medicine list with you in case of an emergency  Follow up with your healthcare provider or urologist as directed: You may need to return to make sure you do not have an infection, or to have your Posada catheter removed  Write down your questions so you remember to ask them during your visits  Posada catheter care: You may be sent home with a Posada catheter  If so you will be provided with instructions to remove it    Please drink plenty of fluids  Blood in your urine is normal for few days following the procedure  Keeping well-hydrated will prevent any trouble from this    Contact your healthcare provider or urologist if:   You have a fever  You have new or more blood in your urine  You have trouble starting to urinate, or have a weak stream of urine when you urinate  You feel like you have a full bladder, even after you urinate  You may also leak urine  You often wake up during the night to urinate  You may also feel the need to urinate right away  You feel pain and burning when you urinate  You feel pain or pressure in your lower abdomen  Your urine looks cloudy, and smells bad  You have trouble getting an erection or ejaculating       You have questions or concerns about your condition or care   Seek care immediately or call 911 if: You urinate little or not at all  You have severe abdominal or back pain  You are dizzy or confused  You have abdominal pain, nausea, and vomiting  Your heartbeat is slower than usual   © 2017 [employer]  Information is for End User's use only and may not be sold, redistributed or otherwise used for commercial purposes  All illustrations and images included in CareNotes® are the copyrighted property of A D A M , Inc  or Jovanni Cabezas  The above information is an  only  It is not intended as medical advice for individual conditions or treatments  Talk to your doctor, nurse or pharmacist before following any medical regimen to see if it is safe and effective for you  Javier Catheter Removal after [address] [facility] [phone]    A Javier catheter is a sterile tube that is inserted into your bladder to drain urine  It is also called an indwelling urinary catheter  The tip of the catheter has a small balloon filled with solution that holds the catheter inside your bladder  This can be removed at home if you prefer; we provide necessary supplies and ensure proper teaching  Or this can be removed in the office by a nurse or healthcare worker, whichever you prefer  Antibiotics may be given to be taken before and after catheter removal to prevent infection  Directions for Javier Catheter removal at home:  Empty any urine out of drainage bag  Wash your hands with soap and water  You may then wear gloves for the procedure if preferred  Put the syringe into the balloon port of the javier catheter  (This is the part not attached to the drainage bag and generally is smaller with a colored portion around it ) Push and twist to make sure the syringe is in proper position  Pull back on plunger to draw water out of the balloon  Detach syringe, empty water into cup or emesis basin   Repeat process of using syringe to empty water from balloon a few more times to ensure balloon is completely empty  You may want to stand or sit in shower/bathtub to remove catheter as urine may drip out when you remove it  Slowly but steadily pull catheter out  Catheter may need to be turned in a Cahto if it at first feels 'stuck ' If catheter does not come out when you pull gently, stop pulling and call the urology office  After javier catheter removal:  You may be asked to drink plenty of water to ensure hydration and to flush out lower urinary tract  Sometimes we have a second visit in the afternoon scheduled to ensure you are able to empty your bladder appropriately  This will be scheduled with javier catheter removal visit if necessary  Normal symptoms after javier catheter removal include urinary urgency, urinary frequency, burning with urination and some blood in urine  These can be normal for 24-48 hours after removal  If these symptoms persist longer than two days, please call the urology office  If you are unable to urinate 8 hours after removal and are uncomfortable or if you develop a fever please call urology office  Follow up as directed by urology office  This follow up will usually be scheduled prior to the surgery or during nurse post operative phone call

## 2023-02-24 NOTE — OP NOTE
Operative Note     PATIENT:  Elliot Watson (MRN [id number])    DATE OF PROCEDURE:   2/24/2023    PRE-OP DIAGNOSES:   1) BPH with obstruction     POST-OP DIAGNOSES AND OPERATIVE FINDINGS:   1) BPH with obstruction    PROCEDURES:  1) UroLift implantation    SURGEON:   Ruby Damon MD    No qualified teaching residents available to assist    ANESTHESIA TYPE:  IV Sedation    ESTIMATED BLOOD LOSS:   Minimal    COMPLICATIONS:   None    ANTIBIOTICS:  Cefazolin     INTRAOPERATIVE THROMBOEMBOLISM PROPHYLAXIS:  Pneumatic compression stockings    PROCEDURE SUMMARY:    The patient was identified, brought to the operating room, and placed on the table in supine position  After induction of general anesthesia, the patient was placed in dorsal lithotomy position and prepped and draped in the usual sterile fashion  A complete formal timeout was performed  A 20F cystoscope was inserted into the bladder  The cystoscopy bridge was replaced with a UroLift delivery device  The first treatment site was the patient's left side approximately 1 5 cm distal to the bladder neck  The distal tip of the delivery device was then angled laterally approximately 20 degrees at this position to compress the lateral lobe  The trigger was pulled, thereby deploying a needle containing the implant through the prostate  The needle was then retracted, allowing one end of the implant to be delivered to the capsular surface of the prostate  The implant was then tensioned to assure capsular seating and removal of slack monofilament  The device was then angled back toward midline and slowly advanced proximally until cystoscopic verification of the monofilament being centered in the delivery bay  The urethral end piece was then affixed to the monofilament thereby tailoring the size of the implant  Excess filament was then severed  The delivery device was then re-advanced into the bladder   The delivery device was then replaced with cystoscope and bridge and the implant location and opening effect was confirmed cystoscopically  The same procedure was then repeated on the right side, and two additional implants were delivered just proximal to the veru montanum, again one on left and one on right side of the prostate, following the same technique  A total of 7 implants were deployed to create a nice anterior channel  Implants were deployed in the following locations:    1  Right bladder neck (advanced tissue control device)  2  Left bladder neck (advanced tissue control device)  3  Right apex  4  Left apex  5 ,6   Right mid gland X 2  7  Left mid gland    A final cystoscopy was conducted first to inspect the location and state of each implant and second, to confirm the presence of a continuous anterior channel was present through the prostatic urethra with irrigation flow turned off  A javier catheter was then placed  The patient tolerated the procedure well and was transferred to the recovery room awake alert and in stable condition  IMPLANTS:   Implant Name Type Inv   Item Serial No   Lot No  LRB No  Used Action   IMPLANT CARTRIDGE UROLIFT 2 - [id number]  IMPLANT CARTRIDGE UROLIFT 2  Teleflex: [facility] [id number] N/A 1 Implanted   IMPLANT CARTRIDGE UROLIFT 2 HANDLE KIT - [id number]  IMPLANT CARTRIDGE UROLIFT 2 HANDLE KIT  Teleflex: [facility] [id number] N/A 1 Implanted   IMPLANT CARTRIDGE UROLIFT 2 - [id number]  IMPLANT CARTRIDGE UROLIFT 2  Teleflex: [facility] [id number] N/A 3 Implanted   IMPLANT UROLIFT ATC SYSTEM - [id number]  IMPLANT UROLIFT ATC SYSTEM  Teleflex: [facility] [id number] N/A 2 Implanted   IMPLANT CARTRIDGE UROLIFT 2 - [id number]  IMPLANT CARTRIDGE UROLIFT 2  Teleflex: [employer] [id number] N/A 1 Implanted        PLAN:    Patient will be discharged home with Javier catheter 24 hours  Medications changes: Dr  To discontinue tamsulosin after 1 week

## 2023-02-27 NOTE — TELEPHONE ENCOUNTER
Patient reports no issue taking catheter out  Reports hesitancy and it takes about 45 seconds to begin stream and isn't confident he is emptying completely  Asked to come in for PVR to ensure there is no retention and patient declined   Advised to call office If urination worsens or any concerns in general  Verbalized understanding

## 2023-03-13 ENCOUNTER — OFFICE VISIT (OUTPATIENT)
Dept: URGENT CARE | Facility: CLINIC | Age: 68
End: 2023-03-13

## 2023-03-13 ENCOUNTER — APPOINTMENT (OUTPATIENT)
Dept: RADIOLOGY | Facility: CLINIC | Age: 68
End: 2023-03-13

## 2023-03-13 VITALS
SYSTOLIC BLOOD PRESSURE: 141 MMHG | WEIGHT: 200 LBS | HEART RATE: 97 BPM | OXYGEN SATURATION: 98 % | RESPIRATION RATE: 18 BRPM | DIASTOLIC BLOOD PRESSURE: 68 MMHG | BODY MASS INDEX: 32.14 KG/M2 | HEIGHT: 66 IN

## 2023-03-13 DIAGNOSIS — M25.571 ACUTE RIGHT ANKLE PAIN: ICD-10-CM

## 2023-03-13 DIAGNOSIS — M25.571 ACUTE RIGHT ANKLE PAIN: Primary | ICD-10-CM

## 2023-03-13 NOTE — LETTER
March 13, 2023     Patient: Vincent Pantoja   YOB: 1955   Date of Visit: 3/13/2023       To Whom it May Concern:    Dede Hartman was seen in my clinic on 3/13/2023  He may return to work on 3/15/2023  If you have any questions or concerns, please don't hesitate to call           Sincerely,          LO Velásquez        CC: No Recipients

## 2023-03-13 NOTE — PATIENT INSTRUCTIONS
Diclofenac twice a day for pain - Take with food   ACE wrap for comfort   Elevate and ice   Follow up with your PCP if no improvement or worsening symptoms     Arthralgia   WHAT YOU NEED TO KNOW:   Arthralgia is pain in one or more joints, with no inflammation  It may be short-term and get better within 6 to 8 weeks  Arthralgia can be an early sign of arthritis  Arthralgia may be caused by a medical condition, such as a hormone disorder or a tumor  It may also be caused by an infection or injury  DISCHARGE INSTRUCTIONS:   Medicines: The following medicines may  be ordered for you:  Acetaminophen  decreases pain  Ask how much to take and how often to take it  Follow directions  Acetaminophen can cause liver damage if not taken correctly  NSAIDs  decrease pain and prevent swelling  Ask your healthcare provider which medicine is right for you  Ask how much to take and when to take it  Take as directed  NSAIDs can cause stomach bleeding and kidney problems if not taken correctly  Pain relief cream  decreases pain  Use this cream as directed  Take your medicine as directed  Contact your healthcare provider if you think your medicine is not helping or if you have side effects  Tell your provider if you are allergic to any medicine  Keep a list of the medicines, vitamins, and herbs you take  Include the amounts, and when and why you take them  Bring the list or the pill bottles to follow-up visits  Carry your medicine list with you in case of an emergency  Follow up with your healthcare provider or specialist as directed:  Write down your questions so you remember to ask them during your visits  Self-care:   Apply heat  to help decrease pain  Use a heating pad or heat wrap  Apply heat for 20 to 30 minutes every 2 hours for as many days as directed  Rest  as much as possible  Avoid activities that cause joint pain  Apply ice  to help decrease swelling and pain   Ice may also help prevent tissue damage  Use an ice pack, or put crushed ice in a plastic bag  Cover it with a towel and place it on your painful joint for 15 to 20 minutes every hour or as directed  Support  the joint with a brace or elastic wrap as directed  Elevate  your joint above the level of your heart as often as you can to help decrease swelling and pain  Prop your painful joint on pillows or blankets to keep it elevated comfortably  Lose weight  if you are overweight  Extra weight can put pressure on your joints and cause more pain  Ask your healthcare provider how much you should weigh  Ask him to help you create a weight loss plan  Exercise  regularly to help improve joint movement and to decrease pain  Ask about the best exercise plan for you  Low-impact exercises can help take the pressure off your joints  Examples are walking, swimming, and water aerobics  Physical therapy:  A physical therapist teaches you exercises to help improve movement and strength, and to decrease pain  Ask your healthcare provider if physical therapy is right for you  Contact your healthcare provider or specialist if:   You have a fever  You continue to have joint pain that cannot be relieved with heat, ice, or medicine  You have pain and inflammation around your joint  You have questions or concerns about your condition or care  Return to the emergency department if:   You have sudden, severe pain when you move your joint  You have a fever and shaking chills  You cannot move your joint  You lose feeling on the side of your body where you have the painful joint  © Copyright [employer] 2022 Information is for End User's use only and may not be sold, redistributed or otherwise used for commercial purposes  The above information is an  only  It is not intended as medical advice for individual conditions or treatments   Talk to your doctor, nurse or pharmacist before following any medical regimen to see if it is safe and effective for you

## 2023-03-13 NOTE — PROGRESS NOTES
[facility]        NAME: Topher Diaz is a 76 y o  male  : 1955    MRN: [id number]  DATE: 2023  TIME: 5:24 PM    Assessment and Plan   Acute right ankle pain [M25 [id number]]  1  Acute right ankle pain  XR ankle 3+ vw right    diclofenac sodium (VOLTAREN) 50 mg EC tablet        Right ankle x-ray negative for acute fracture  New Ace wrap applied  Advised twice a day diclofenac  Ice and elevation  Following up with primary care provider if no improvement  Patient Instructions       Follow up with PCP in 3-5 days  Proceed to  ER if symptoms worsen  Chief Complaint     Chief Complaint   Patient presents with   • Foot Pain     Patient woke up to his right ankle swollen, painful when stepping  History of Present Illness       Patient is a 66-year-old male presenting with right ankle pain that started last night when he woke up to go to work  He states the pain was not there when he went to sleep  Denies known injury  There is swelling  Denies ecchymosis or erythema  He applied an Ace wrap with improvement  No other over-the-counter medications  Review of Systems   Review of Systems   Constitutional: Negative for activity change, chills and fever  Respiratory: Negative for cough  Musculoskeletal: Positive for arthralgias, gait problem and joint swelling  Skin: Negative for rash  Neurological: Negative for weakness and numbness           Current Medications       Current Outpatient Medications:   •  acetaminophen (TYLENOL) 325 mg tablet, Take 2 tablets (650 mg total) by mouth every 4 (four) hours as needed for mild pain, Disp: 30 tablet, Rfl: 0  •  diclofenac sodium (VOLTAREN) 50 mg EC tablet, Take 1 tablet (50 mg total) by mouth 2 (two) times a day, Disp: 20 tablet, Rfl: 0  •  lisinopril-hydrochlorothiazide (PRINZIDE,ZESTORETIC) 20-12 5 MG per tablet, daily, Disp: , Rfl:   •  omeprazole (PriLOSEC) 20 mg delayed release capsule, , Disp: , Rfl:   •  simvastatin (ZOCOR) 20 mg tablet, , Disp: , Rfl:   •  tamsulosin (FLOMAX) 0 4 mg, Take 1 capsule (0 4 mg total) by mouth daily with dinner, Disp: 90 capsule, Rfl: 3  •  diclofenac sodium (VOLTAREN) 50 mg EC tablet, Take 1 tablet (50 mg total) by mouth 3 (three) times a day for 7 days, Disp: 21 tablet, Rfl: 0  •  docusate sodium (COLACE) 100 mg capsule, Take 1 capsule (100 mg total) by mouth 2 (two) times a day for 15 days, Disp: 30 capsule, Rfl: 0    Current Allergies     Allergies as of 03/13/2023   • (No Known Allergies)            The following portions of the patient's history were reviewed and updated as appropriate: allergies, current medications, past family history, past medical history, past social history, past surgical history and problem list      Past Medical History:   Diagnosis Date   • BPH (benign prostatic hyperplasia)    • GERD (gastroesophageal reflux disease)    • Hyperlipidemia    • Hypertension        Past Surgical History:   Procedure Laterality Date   • COLONOSCOPY     • CYSTOSCOPY     • SD CYSTO INSERTION TRANSPROSTATIC IMPLANT SINGLE N/A 2/24/2023    Procedure: CYSTOSCOPY WITH INSERTION Sylvia Book;  Surgeon: June Fernandez MD;  [facility]: [facility];  Service: Urology   • TONSILLECTOMY      when pt was 13yo       No family history on file  Medications have been verified  Objective   /68   Pulse 97   Resp 18   Ht 5' 6" (1 676 m)   Wt 90 7 kg (200 lb)   SpO2 98%   BMI 32 28 kg/m²        Physical Exam     Physical Exam  Vitals reviewed  Constitutional:       General: He is awake  He is not in acute distress  Appearance: Normal appearance  Cardiovascular:      Rate and Rhythm: Normal rate  Pulmonary:      Effort: Pulmonary effort is normal    Musculoskeletal:      Right ankle: Swelling present  No ecchymosis or lacerations  Tenderness present over the ATF ligament  No lateral malleolus or medial malleolus tenderness  Decreased range of motion     Skin:     General: Skin is warm and moist    Neurological:      General: No focal deficit present  Mental Status: He is alert and oriented to person, place, and time  Psychiatric:         Behavior: Behavior is cooperative

## 2023-04-03 ENCOUNTER — OFFICE VISIT (OUTPATIENT)
Dept: UROLOGY | Facility: CLINIC | Age: 68
End: 2023-04-03

## 2023-04-03 VITALS
HEART RATE: 91 BPM | BODY MASS INDEX: 32.14 KG/M2 | OXYGEN SATURATION: 98 % | DIASTOLIC BLOOD PRESSURE: 68 MMHG | WEIGHT: 200 LBS | SYSTOLIC BLOOD PRESSURE: 140 MMHG | HEIGHT: 66 IN

## 2023-04-03 DIAGNOSIS — Z12.5 SCREENING FOR PROSTATE CANCER: Primary | ICD-10-CM

## 2023-04-03 NOTE — PROGRESS NOTES
1  Screening for prostate cancer  PSA, Total Screen            Assessment and plan:       1  BPH s/p Urolift 2/24/23  -Patient doing excellently after UroLift  -Alpha blockade has been discontinued  -Conservative measures reviewed  -Assuming symptoms remain stable in 6 months, can follow-up as needed  -Call sooner with any urinary concerns    2  Prostate Cancer screening  - PSA 1 1 (2/15/22)  -Return to clinic in 6 months with PSA and due for CLIFFORD at that time      Aleksandr Tamez PA-C      Chief Complaint     Urolift follw up    History of Present Illness     Mara Nevarez is a 76 y o  presenting for follow up  Patient previously followed by Dr Yvonne Kraus  Longstanding history of BPH with lower urinary tract symptoms  Has been on tamsulosin monotherapy for period of time  Has some mild lower urinary tract symptoms overall comfortable with his urination  Denies any dysuria, gross hematuria, incontinence, or infections      CT urogram (05/04/2019) a negative for any urinary tract abnormalities  Prostatomegaly was noted  Cystoscopy 1/5/23 revealing pure bilobar hyperplasia, high prostatic fossa  S/p urolift (2/24/23) with Dr Shae Teran  Patient has since discontinued his tamsulosin  He is overall very happy with the results of his UroLift over the past 2 weeks  Endorses a strong stream and good urinary results  No adverse side effects at this time      Patient denies any history of  surgical manipulation or  malignancies      PSA 1 1 (2/15/22)    uroflow reveals a voided volume of 65 mL, therefore not remarkably accurate     PVR 72 mL    AUA SYMPTOM SCORE    Flowsheet Row Most Recent Value   AUA SYMPTOM SCORE    How often have you had a sensation of not emptying your bladder completely after you finished urinating? 1 (P)     How often have you had to urinate again less than two hours after you finished urinating? 1 (P)     How often have you found you stopped and started again several times when you urinate? 0 (P)     How often have you found it difficult to postpone urination? 0 (P)     How often have you had a weak urinary stream? 0 (P)     How often have you had to push or strain to begin urination? 0 (P)     How many times did you most typically get up to urinate from the time you went to bed at night until the time you got up in the morning? 1 (P)     Quality of Life: If you were to spend the rest of your life with your urinary condition just the way it is now, how would you feel about that? 1 (P)     AUA SYMPTOM SCORE 3 (P)             Review of Systems     Review of Systems   Constitutional: Negative for activity change and fatigue  HENT: Negative for congestion  Eyes: Negative for visual disturbance  Respiratory: Negative for shortness of breath and wheezing  Cardiovascular: Negative for chest pain and leg swelling  Gastrointestinal: Negative for abdominal pain  Endocrine: Negative for polyuria  Genitourinary: Negative for dysuria, flank pain, hematuria and urgency  Musculoskeletal: Negative for back pain  Allergic/Immunologic: Negative for immunocompromised state  Neurological: Negative for dizziness and numbness  Psychiatric/Behavioral: Negative for dysphoric mood  All other systems reviewed and are negative      AUA SYMPTOM SCORE    Flowsheet Row Most Recent Value   AUA SYMPTOM SCORE    How often have you had a sensation of not emptying your bladder completely after you finished urinating? 1 (P)     How often have you had to urinate again less than two hours after you finished urinating? 1 (P)     How often have you found you stopped and started again several times when you urinate? 0 (P)     How often have you found it difficult to postpone urination? 0 (P)     How often have you had a weak urinary stream? 1 (P)     How often have you had to push or strain to begin urination? 0 (P)     How many times did you most typically get up to urinate from the time you "went to bed at night until the time you got up in the morning? 2 (P)     Quality of Life: If you were to spend the rest of your life with your urinary condition just the way it is now, how would you feel about that? 2 (P)     AUA SYMPTOM SCORE 5 (P)             Allergies     No Known Allergies    Physical Exam       Physical Exam  Constitutional:       General: He is not in acute distress  Appearance: He is well-developed  HENT:      Head: Normocephalic and atraumatic  Cardiovascular:      Comments: Negative lower extremity edema  Pulmonary:      Effort: Pulmonary effort is normal       Breath sounds: Normal breath sounds  Abdominal:      Palpations: Abdomen is soft  Musculoskeletal:         General: Normal range of motion  Cervical back: Normal range of motion  Skin:     General: Skin is warm  Neurological:      Mental Status: He is alert and oriented to person, place, and time     Psychiatric:         Behavior: Behavior normal            Vital Signs  Vitals:    04/03/23 1019   BP: 140/68   BP Location: Left arm   Patient Position: Sitting   Pulse: 91   SpO2: 98%   Weight: 90 7 kg (200 lb)   Height: 5' 6\" (1 676 m)         Current Medications       Current Outpatient Medications:   •  lisinopril-hydrochlorothiazide (PRINZIDE,ZESTORETIC) 20-12 5 MG per tablet, daily, Disp: , Rfl:   •  omeprazole (PriLOSEC) 20 mg delayed release capsule, , Disp: , Rfl:   •  simvastatin (ZOCOR) 20 mg tablet, , Disp: , Rfl:   •  tamsulosin (FLOMAX) 0 4 mg, Take 1 capsule (0 4 mg total) by mouth daily with dinner (Patient not taking: Reported on 4/3/2023), Disp: 90 capsule, Rfl: 3      Active Problems     Patient Active Problem List   Diagnosis   • Benign prostatic hyperplasia with lower urinary tract symptoms         Past Medical History     Past Medical History:   Diagnosis Date   • BPH (benign prostatic hyperplasia)    • GERD (gastroesophageal reflux disease)    • Hyperlipidemia    • Hypertension          Surgical " "History     Past Surgical History:   Procedure Laterality Date   • COLONOSCOPY     • CYSTOSCOPY     • AL CYSTO INSERTION TRANSPROSTATIC IMPLANT SINGLE N/A 2023    Procedure: CYSTOSCOPY WITH INSERTION UROLIFT;  Surgeon: Ellen Gavin MD;  Location: [facility] MAIN OR;  Service: Urology   • TONSILLECTOMY      when pt was 17yo         Family History     No family history on file  Social History     Social History     Social History     Tobacco Use   Smoking Status Former   • Types: Cigarettes   • Quit date:    • Years since quittin 2   Smokeless Tobacco Never   Tobacco Comments              Pertinent Lab Values     No results found for: CREATININE    Lab Results   Component Value Date    PSA 1 1 02/15/2022       Pertinent Imaging      - n/a    Portions of the record may have been created with voice recognition software  Occasional wrong word or \"sound a like\" substitutions may have occurred due to the inherent limitations of voice recognition software  Read the chart carefully and recognize, using context, where substitutions have occurred    "

## 2023-04-24 ENCOUNTER — APPOINTMENT (OUTPATIENT)
Dept: LAB | Facility: AMBULARY SURGERY CENTER | Age: 68
End: 2023-04-24

## 2023-04-24 DIAGNOSIS — E55.9 AVITAMINOSIS D: ICD-10-CM

## 2023-04-24 DIAGNOSIS — R73.09 IMPAIRED GLUCOSE TOLERANCE TEST: ICD-10-CM

## 2023-04-24 DIAGNOSIS — D64.9 RELATIVE ANEMIA: ICD-10-CM

## 2023-04-24 DIAGNOSIS — M10.00 GOUTY NEURITIS (HCC): ICD-10-CM

## 2023-04-24 DIAGNOSIS — G63 GOUTY NEURITIS (HCC): ICD-10-CM

## 2023-04-24 DIAGNOSIS — D51.9 ANEMIA DUE TO VITAMIN B12 DEFICIENCY, UNSPECIFIED B12 DEFICIENCY TYPE: ICD-10-CM

## 2023-04-24 LAB
25(OH)D3 SERPL-MCNC: 11.6 NG/ML (ref 30–100)
ALBUMIN SERPL BCP-MCNC: 4 G/DL (ref 3.5–5)
ALP SERPL-CCNC: 81 U/L (ref 46–116)
ALT SERPL W P-5'-P-CCNC: 42 U/L (ref 12–78)
ANION GAP SERPL CALCULATED.3IONS-SCNC: 2 MMOL/L (ref 4–13)
AST SERPL W P-5'-P-CCNC: 32 U/L (ref 5–45)
BASOPHILS # BLD AUTO: 0.07 THOUSANDS/ΜL (ref 0–0.1)
BASOPHILS NFR BLD AUTO: 1 % (ref 0–1)
BILIRUB SERPL-MCNC: 0.5 MG/DL (ref 0.2–1)
BUN SERPL-MCNC: 34 MG/DL (ref 5–25)
CALCIUM SERPL-MCNC: 8.9 MG/DL (ref 8.3–10.1)
CHLORIDE SERPL-SCNC: 109 MMOL/L (ref 96–108)
CHOLEST SERPL-MCNC: 161 MG/DL
CO2 SERPL-SCNC: 25 MMOL/L (ref 21–32)
CREAT SERPL-MCNC: 1.81 MG/DL (ref 0.6–1.3)
EOSINOPHIL # BLD AUTO: 0.25 THOUSAND/ΜL (ref 0–0.61)
EOSINOPHIL NFR BLD AUTO: 4 % (ref 0–6)
ERYTHROCYTE [DISTWIDTH] IN BLOOD BY AUTOMATED COUNT: 14.5 % (ref 11.6–15.1)
EST. AVERAGE GLUCOSE BLD GHB EST-MCNC: 111 MG/DL
GFR SERPL CREATININE-BSD FRML MDRD: 37 ML/MIN/1.73SQ M
GLUCOSE P FAST SERPL-MCNC: 107 MG/DL (ref 65–99)
HBA1C MFR BLD: 5.5 %
HCT VFR BLD AUTO: 39.7 % (ref 36.5–49.3)
HDLC SERPL-MCNC: 30 MG/DL
HGB BLD-MCNC: 13 G/DL (ref 12–17)
IMM GRANULOCYTES # BLD AUTO: 0.02 THOUSAND/UL (ref 0–0.2)
IMM GRANULOCYTES NFR BLD AUTO: 0 % (ref 0–2)
LDLC SERPL CALC-MCNC: 86 MG/DL (ref 0–100)
LYMPHOCYTES # BLD AUTO: 2.05 THOUSANDS/ΜL (ref 0.6–4.47)
LYMPHOCYTES NFR BLD AUTO: 32 % (ref 14–44)
MCH RBC QN AUTO: 29.1 PG (ref 26.8–34.3)
MCHC RBC AUTO-ENTMCNC: 32.7 G/DL (ref 31.4–37.4)
MCV RBC AUTO: 89 FL (ref 82–98)
MONOCYTES # BLD AUTO: 0.52 THOUSAND/ΜL (ref 0.17–1.22)
MONOCYTES NFR BLD AUTO: 8 % (ref 4–12)
NEUTROPHILS # BLD AUTO: 3.48 THOUSANDS/ΜL (ref 1.85–7.62)
NEUTS SEG NFR BLD AUTO: 55 % (ref 43–75)
NONHDLC SERPL-MCNC: 131 MG/DL
NRBC BLD AUTO-RTO: 0 /100 WBCS
PLATELET # BLD AUTO: 215 THOUSANDS/UL (ref 149–390)
PMV BLD AUTO: 11.5 FL (ref 8.9–12.7)
POTASSIUM SERPL-SCNC: 4.6 MMOL/L (ref 3.5–5.3)
PROT SERPL-MCNC: 7.1 G/DL (ref 6.4–8.4)
RBC # BLD AUTO: 4.47 MILLION/UL (ref 3.88–5.62)
SODIUM SERPL-SCNC: 136 MMOL/L (ref 135–147)
TRIGL SERPL-MCNC: 225 MG/DL
URATE SERPL-MCNC: 10.2 MG/DL (ref 3.5–8.5)
VIT B12 SERPL-MCNC: 251 PG/ML (ref 100–900)
WBC # BLD AUTO: 6.39 THOUSAND/UL (ref 4.31–10.16)

## 2023-05-09 ENCOUNTER — APPOINTMENT (OUTPATIENT)
Dept: LAB | Facility: AMBULARY SURGERY CENTER | Age: 68
End: 2023-05-09

## 2023-05-09 DIAGNOSIS — N18.9 ACUTE KIDNEY INJURY SUPERIMPOSED ON CHRONIC KIDNEY DISEASE (HCC): ICD-10-CM

## 2023-05-09 DIAGNOSIS — Z12.5 SCREENING FOR PROSTATE CANCER: ICD-10-CM

## 2023-05-09 DIAGNOSIS — N17.9 ACUTE KIDNEY INJURY SUPERIMPOSED ON CHRONIC KIDNEY DISEASE (HCC): ICD-10-CM

## 2023-05-09 LAB
BUN SERPL-MCNC: 29 MG/DL (ref 5–25)
CREAT SERPL-MCNC: 1.51 MG/DL (ref 0.6–1.3)
GFR SERPL CREATININE-BSD FRML MDRD: 46 ML/MIN/1.73SQ M

## 2023-05-16 ENCOUNTER — APPOINTMENT (OUTPATIENT)
Dept: LAB | Facility: AMBULARY SURGERY CENTER | Age: 68
End: 2023-05-16

## 2023-05-16 DIAGNOSIS — N17.9 ACUTE KIDNEY INJURY SUPERIMPOSED ON CHRONIC KIDNEY DISEASE (HCC): ICD-10-CM

## 2023-05-16 DIAGNOSIS — N18.9 ACUTE KIDNEY INJURY SUPERIMPOSED ON CHRONIC KIDNEY DISEASE (HCC): ICD-10-CM

## 2023-05-16 LAB
CREAT 24H UR-MRATE: 1.5 G/24HR (ref 0.8–1.8)
CREAT CL 24H UR+SERPL-VRATE: 63.69 ML/MIN (ref 80–125)
CREAT SERPL-MCNC: 1.38 MG/DL (ref 0.6–1.3)
CREAT UR-MCNC: 137 MG/DL
SPECIMEN VOL UR: 1100 ML

## 2023-05-26 ENCOUNTER — TELEPHONE (OUTPATIENT)
Dept: NEPHROLOGY | Facility: CLINIC | Age: 68
End: 2023-05-26

## 2023-05-26 NOTE — TELEPHONE ENCOUNTER
New Patient Intake Form   Patient Details   Armond Rodrigez     1955     [id number]     Insurance Information   Name of [place] [phone]   Does the patient need an insurance referral? no   If patient has [facility], please ask if they will be using their [facility]  Appointment Information   Who is calling to schedule? If not patient, what is callers name? Patient   Referring Provider  Osvaldo Braxton MD   Reason for Appt (Diagnosis) Abnormal labs   Does Patient have labs/urine done at [facility]? If not, where do they go? List the date of last lab / urine  *Please try to get labs 2 years back if not at  yes   Has patient been hospitalized recently? If yes, list name and location of hospital they were in no   Has patient been seen by a Nephrologist before? If yes, list name, location and phone number no   Has the patient had renal imaging done? If so, list the most recent date and type of imaging no    Does patient have a history of Kidney Stones?  no   Appointment Details   Is there a referral on file? no    Appointment Date 08/08/23   Location  [facility]   Miscellaneous

## 2023-06-22 ENCOUNTER — OFFICE VISIT (OUTPATIENT)
Dept: DERMATOLOGY | Facility: CLINIC | Age: 68
End: 2023-06-22
Payer: COMMERCIAL

## 2023-06-22 VITALS — BODY MASS INDEX: 31.39 KG/M2 | WEIGHT: 195.3 LBS | HEIGHT: 66 IN | TEMPERATURE: 97 F

## 2023-06-22 DIAGNOSIS — L82.1 SEBORRHEIC KERATOSIS: Primary | ICD-10-CM

## 2023-06-22 DIAGNOSIS — Z12.83 SCREENING FOR MALIGNANT NEOPLASM OF SKIN: ICD-10-CM

## 2023-06-22 PROCEDURE — 99203 OFFICE O/P NEW LOW 30 MIN: CPT | Performed by: DERMATOLOGY

## 2023-06-22 NOTE — PROGRESS NOTES
"[facility] Note     Patient Name: Lynn Escobedo  Encounter Date: [phone]     Have you been cared for by a [facility] Dermatologist in the last 3 years and, if so, which description applies to you? NO  I am considered a \"new\" patient and must complete all patient intake questions  I am MALE/not capable of bearing children  REVIEW OF SYSTEMS:  Have you recently had or currently have any of the following? · Recent fever or chills? No  · Any non-healing wound? No   PAST MEDICAL HISTORY:  Have you personally ever had or currently have any of the following? If \"YES,\" then please provide more detail  · Skin cancer (such as Melanoma, Basal Cell Carcinoma, Squamous Cell Carcinoma? No  · Tuberculosis, HIV/AIDS, Hepatitis B or C: No  · Systemic Immunosuppression such as Diabetes, Biologic or Immunotherapy, Chemotherapy, Organ Transplantation, Bone Marrow Transplantation No  · Radiation Treatment No   FAMILY HISTORY:  Any \"first degree relatives\" (parent, brother, sister, or child) with the following? • Skin Cancer, Pancreatic or Other Cancer? No   PATIENT EXPERIENCE:    • Do you want the Dermatologist to perform a COMPLETE skin exam today including a clinical examination under the \"bra and underwear\" areas? NO  • If necessary, do we have your permission to call and leave a detailed message on your Preferred Phone number that includes your specific medical information?   Yes      No Known Allergies   Current Outpatient Medications:   •  lisinopril-hydrochlorothiazide (PRINZIDE,ZESTORETIC) 20-12 5 MG per tablet, daily, Disp: , Rfl:   •  omeprazole (PriLOSEC) 20 mg delayed release capsule, , Disp: , Rfl:   •  simvastatin (ZOCOR) 20 mg tablet, , Disp: , Rfl:   •  tamsulosin (FLOMAX) 0 4 mg, Take 1 capsule (0 4 mg total) by mouth daily with dinner, Disp: 90 capsule, Rfl: 3          • Whom besides the patient is providing clinical information about today's encounter?   o NO ADDITIONAL HISTORIAN " "(patient alone provided history)    Physical Exam and Assessment/Plan by Diagnosis:    SEBORRHEIC KERATOSIS; NON-INFLAMED    Physical Exam:  • Anatomic Location Affected:  Upper back   • Morphological Description:  Flat and raised, waxy, smooth to warty textured, tan, discrete, \"stuck-on\" appearing papules/plaque  • Pertinent Positives:  • Pertinent Negatives: Additional History of Present Condition:  Patient reports crusted lesions on back present for a year   Denies itch, burn, pain, bleeding or ulceration  Present constantly; nothing seems to make it worse or better  No prior treatment  Assessment and Plan:  Based on a thorough discussion of this condition and the management approach to it (including a comprehensive discussion of the known risks, side effects and potential benefits of treatment), the patient (family) agrees to implement the following specific plan:  • Monitor for changes  Seborrheic Keratosis  A seborrheic keratosis is a harmless warty spot that appears during adult life as a common sign of skin aging  Seborrheic keratoses can arise on any area of skin, covered or uncovered, with the usual exception of the palms and soles  They do not arise from mucous membranes  Seborrheic keratoses can have highly variable appearance  Seborrheic keratoses are extremely common  It has been estimated that over 90% of adults over the age of 61 years have one or more of them  They occur in males and females of all races, typically beginning to erupt in the 35s or 45s  They are uncommon under the age of 21 years  The precise cause of seborrhoeic keratoses is not known  Seborrhoeic keratoses are considered degenerative in nature  As time goes by, seborrheic keratoses tend to become more numerous  Some people inherit a tendency to develop a very large number of them; some people may have hundreds of them      The name \"seborrheic keratosis\" is misleading, because these lesions are not " "limited to a seborrhoeic distribution (scalp, mid-face, chest, upper back), nor are they formed from sebaceous glands, nor are they associated with sebum -- which is greasy  Seborrheic keratosis may also be called \"SK,\" \"Seb K,\" \"basal cell papilloma,\" \"senile wart,\" or \"barnacle  \"      Researchers have noted:  • Eruptive seborrhoeic keratoses can follow sunburn or dermatitis  • Skin friction may be the reason they appear in body folds  • Viral cause (e g , human papillomavirus) seems unlikely  • Stable and clonal mutations or activation of FRFR3, PIK3CA, IRLANDA, AKT1 and EGFR genes are found in seborrhoeic keratoses  • Seborrhoeic keratosis can arise from solar lentigo  • FRFR3 mutations also arise in solar lentigines  These mutations are associated with increased age and location on the head and neck, suggesting a role of ultraviolet radiation in these lesions  • Seborrheic keratoses do not harbour tumour suppressor gene mutations  • Epidermal growth factor receptor inhibitors, which are used to treat some cancers, often result in an increase in verrucal (warty) keratoses  There is no easy way to remove multiple lesions on a single occasion  Unless a specific lesion is \"inflamed\" and is causing pain or stinging/burning or is bleeding, most insurance companies do not authorize treatment  Aimee Huber MD    Scribe Attestation    I,:  Brielle Turcios am acting as a scribe while in the presence of the attending physician :       I,:  Norma Perez MD personally performed the services described in this documentation    as scribed in my presence  :           "

## 2023-06-22 NOTE — PATIENT INSTRUCTIONS
"SEBORRHEIC KERATOSIS; NON-INFLAMED    Physical Exam:  Anatomic Location Affected:  Upper back   Morphological Description:  Flat and raised, waxy, smooth to warty textured, yellow to brownish-grey to dark brown to blackish, discrete, \"stuck-on\" appearing papules  Pertinent Positives:  Pertinent Negatives: Additional History of Present Condition:  Patient reports crusted lesions on back present for a year   Denies itch, burn, pain, bleeding or ulceration  Present constantly; nothing seems to make it worse or better  No prior treatment  Assessment and Plan:  Based on a thorough discussion of this condition and the management approach to it (including a comprehensive discussion of the known risks, side effects and potential benefits of treatment), the patient (family) agrees to implement the following specific plan:  Reassured benign  Monitor for changes  Seborrheic Keratosis  A seborrheic keratosis is a harmless warty spot that appears during adult life as a common sign of skin aging  Seborrheic keratoses can arise on any area of skin, covered or uncovered, with the usual exception of the palms and soles  They do not arise from mucous membranes  Seborrheic keratoses can have highly variable appearance  Seborrheic keratoses are extremely common  It has been estimated that over 90% of adults over the age of 61 years have one or more of them  They occur in males and females of all races, typically beginning to erupt in the 35s or 45s  They are uncommon under the age of 21 years  The precise cause of seborrhoeic keratoses is not known  Seborrhoeic keratoses are considered degenerative in nature  As time goes by, seborrheic keratoses tend to become more numerous  Some people inherit a tendency to develop a very large number of them; some people may have hundreds of them      The name \"seborrheic keratosis\" is misleading, because these lesions are not limited to a seborrhoeic distribution (scalp, " "mid-face, chest, upper back), nor are they formed from sebaceous glands, nor are they associated with sebum -- which is greasy  Seborrheic keratosis may also be called \"SK,\" \"Seb K,\" \"basal cell papilloma,\" \"senile wart,\" or \"barnacle  \"      Researchers have noted:  Eruptive seborrhoeic keratoses can follow sunburn or dermatitis  Skin friction may be the reason they appear in body folds  Viral cause (e g , human papillomavirus) seems unlikely  Stable and clonal mutations or activation of FRFR3, PIK3CA, IRLANDA, AKT1 and EGFR genes are found in seborrhoeic keratoses  Seborrhoeic keratosis can arise from solar lentigo  FRFR3 mutations also arise in solar lentigines  These mutations are associated with increased age and location on the head and neck, suggesting a role of ultraviolet radiation in these lesions  Seborrheic keratoses do not harbour tumour suppressor gene mutations  Epidermal growth factor receptor inhibitors, which are used to treat some cancers, often result in an increase in verrucal (warty) keratoses  There is no easy way to remove multiple lesions on a single occasion  Unless a specific lesion is \"inflamed\" and is causing pain or stinging/burning or is bleeding, most insurance companies do not authorize treatment    "

## 2023-08-08 ENCOUNTER — OFFICE VISIT (OUTPATIENT)
Dept: NEPHROLOGY | Facility: CLINIC | Age: 68
End: 2023-08-08

## 2023-08-08 VITALS
HEIGHT: 66 IN | DIASTOLIC BLOOD PRESSURE: 75 MMHG | WEIGHT: 198 LBS | BODY MASS INDEX: 31.82 KG/M2 | SYSTOLIC BLOOD PRESSURE: 130 MMHG | HEART RATE: 76 BPM

## 2023-08-08 DIAGNOSIS — Z87.438 HISTORY OF BPH: ICD-10-CM

## 2023-08-08 DIAGNOSIS — N18.2 BENIGN HYPERTENSION WITH CKD (CHRONIC KIDNEY DISEASE), STAGE II: ICD-10-CM

## 2023-08-08 DIAGNOSIS — I12.9 BENIGN HYPERTENSION WITH CKD (CHRONIC KIDNEY DISEASE), STAGE II: ICD-10-CM

## 2023-08-08 DIAGNOSIS — E55.9 VITAMIN D DEFICIENCY: ICD-10-CM

## 2023-08-08 DIAGNOSIS — R79.89 ELEVATED SERUM CREATININE: ICD-10-CM

## 2023-08-08 DIAGNOSIS — N18.2 STAGE 2 CHRONIC KIDNEY DISEASE: Primary | ICD-10-CM

## 2023-08-08 DIAGNOSIS — R94.4 DECREASED CALCULATED GFR: ICD-10-CM

## 2023-08-08 LAB
SL AMB  POCT GLUCOSE, UA: NORMAL
SL AMB LEUKOCYTE ESTERASE,UA: NORMAL
SL AMB POCT BILIRUBIN,UA: NORMAL
SL AMB POCT BLOOD,UA: NORMAL
SL AMB POCT KETONES,UA: NORMAL
SL AMB POCT NITRITE,UA: NORMAL
SL AMB POCT PH,UA: 6
SL AMB POCT SPECIFIC GRAVITY,UA: 1.01
SL AMB POCT URINE PROTEIN: NORMAL
SL AMB POCT UROBILINOGEN: 0.2

## 2023-08-08 NOTE — PROGRESS NOTES
NEPHROLOGY OUTPATIENT CONSULTATION   Nallely Damico 76 y.o. male MRN: [id number]  Date: 08/08/23  Reason for consultation: Elevated creatinine    ASSESSMENT and PLAN:  45-year-old male was seen in nephrology office today for evaluation of elevated creatinine.     # Elevated creatinine on Chronic Kidney Disease Stage II  - Rule out glomerulonephritis   - Etiology/risk factors for CKD: Hypertension, PPI use, age-related nephron loss  - Baseline Cr: 1.26 in 2019, 1.4-1.8 since 04/2023, most recently 1.38   - 24-hour creatinine clearance 63 mL/min 05/2023, check Cystatin C to correlate with creatinine based GFR  - Urinalysis: Negative for protein or blood by dipstick analysis in office today  - Urine sediment: Few dysmorphic RBCs, no RBC casts or WBC casts  - Proteinuria: UACR 9 mg/g 05/2023  - Imaging: Bilateral cortical renal cysts on CT 05/2019, check kidney ultrasound to look at renal parenchyma and to rule out obstructive etiologies  - Serologies: Check C3/C4/DARIEL/dsDNA/ANCA/anti-GBM/hep B/hep C serologies/SPEP/serum free light chain ratio for further evaluation of glomerulonephritis in setting of dysmorphic RBCs  - Discussed risk factor reduction to slow progression of chronic kidney disease  • Intensive blood pressure control as below  • Lipid control, on Zocor 20 mg daily  • Lifestyle modifications (weight loss/exercise)  • Avoidance of NSAIDs  • Avoidance of PPI if no hard indication for the use (to discuss with PCP)    # History of BPH  - Status post UroLift 02/2023  - Currently symptom-free and follows with urology    # Hypertension/Volume   - Goal BP <120/80 per KDIGO guidelines   - Home BP: ~ 120/80  - Volume status: Euvolemic  - Status: Blood pressure currently above goal in the office but better controlled at home  - Current antihypertensive regimen: Lisinopril-hydrochlorothiazide 20-12.5 mg daily  - Changes: No changes to antihypertensive regimen at this time, patient encouraged to continue to check blood pressure at home    # Screening for Anemia   - Target Hb: More than 10 g/dL  - Most recent hemoglobin: 13 g/dL    # Electrolytes/Acid Base status   - Electrolytes and acid base status within normal limits    # CKD Mineral and Bone Disorder   - Goal Ca 8.5-10 mg/dL, goal Phos 2.7-4.6 mg/dL, goal iPTH 30-70 pg/mL  - Check iPTH level    # Vitamin D deficiency  - Most recent vitamin D level 11.6 04/2023  - Status post ergocalciferol 50,000 units weekly for 12 weeks, currently taking cholecalciferol 5000 units daily    HISTORY OF PRESENT ILLNESS:  Requesting Physician: Erin Gonzalez MD    Shalonda Hull is a 76 y.o. male who has history of hypertension for around 10 years. He is on lisinopril-hydrochlorothiazide combination for that period of time. He also has history of BPH status post UroLift procedure in 02/2023. Since UroLift, his urinary symptoms have resolved. He has a good urinary stream.  He has history of acid reflux and is on PPI for 10 years. He takes NSAIDs occasionally. He does not have any family history of kidney disease. He denies dyspnea. He denies leg swelling.    >> Major risk factors for CKD  - Diabetes: No   - Hypertension: Yes for 10 years   - Age ?  54 years: Yes   - Family history of kidney disease: No   - Obesity or metabolic syndrome: Yes     >> Medical history evaluation   - Prior kidney disease or dialysis: No   - Incidental hematuria in the past: Yes microscopic hematuria    - Urinary symptoms: Stream is good after urolift   - History of foamy or frothy urine: No  - History of nephrolithiasis: No  - Diseases that share risk factors with CKD: HTN  - Systemic diseases that might affect kidney: No   - History of use of medications that might affect renal function: NSAID's occassionally, PPI for 10 years     PAST MEDICAL HISTORY:  Past Medical History:   Diagnosis Date   • BPH (benign prostatic hyperplasia)    • GERD (gastroesophageal reflux disease)    • Hyperlipidemia    • Hypertension PAST SURGICAL HISTORY:  Past Surgical History:   Procedure Laterality Date   • COLONOSCOPY     • CYSTOSCOPY     • [place] CYSTO INSERTION TRANSPROSTATIC IMPLANT SINGLE N/A 2023    Procedure: CYSTOSCOPY WITH INSERTION UROLIFT;  Surgeon: Demarcus Painter MD;  Location: [facility] MA[facility];  Service: Urology   • TONSILLECTOMY      when pt was 13yo       ALLERGIES:  No Known Allergies    SOCIAL HISTORY:  Social History     Substance and Sexual Activity   Alcohol Use Not Currently     Social History     Substance and Sexual Activity   Drug Use Never     Social History     Tobacco Use   Smoking Status Former   • Packs/day: 1.00   • Years: 20.00   • Total pack years: 20.00   • Types: Cigarettes   • Start date: 3/7/1971   • Quit date: 3/7/1991   • Years since quittin.4   Smokeless Tobacco Never   Tobacco Comments            FAMILY HISTORY:  Family History   Problem Relation Age of Onset   • Diabetes Mother    • Cancer Brother         bladder cancer       MEDICATIONS:    Current Outpatient Medications:   •  lisinopril-hydrochlorothiazide (PRINZIDE,ZESTORETIC) 20-12.5 MG per tablet, daily, Disp: , Rfl:   •  omeprazole (PriLOSEC) 20 mg delayed release capsule, , Disp: , Rfl:   •  simvastatin (ZOCOR) 20 mg tablet, , Disp: , Rfl:   •  tamsulosin (FLOMAX) 0.4 mg, Take 1 capsule (0.4 mg total) by mouth daily with dinner, Disp: 90 capsule, Rfl: 3    REVIEW OF SYSTEMS:  Review of Systems   Constitutional: Negative for chills and fever. HENT: Negative for ear pain and sore throat. Eyes: Negative for pain and visual disturbance. Respiratory: Negative for cough and shortness of breath. Cardiovascular: Negative for chest pain and palpitations. Gastrointestinal: Negative for abdominal pain and vomiting. Genitourinary: Negative for dysuria and hematuria. Musculoskeletal: Negative for arthralgias and back pain. Skin: Negative for color change and rash. Neurological: Negative for seizures and syncope.    All other systems reviewed and are negative. All the systems were reviewed and were negative except as documented on the HPI. PHYSICAL EXAMINATION:  There were no vitals taken for this visit. Current Weight:   There is no height or weight on file to calculate BMI. Physical Exam  Constitutional:       Appearance: Normal appearance. HENT:      Head: Normocephalic and atraumatic. Mouth/Throat:      Mouth: Mucous membranes are moist.      Pharynx: Oropharynx is clear. Cardiovascular:      Rate and Rhythm: Normal rate and regular rhythm. Pulses: Normal pulses. Heart sounds: Normal heart sounds. Pulmonary:      Effort: Pulmonary effort is normal.      Breath sounds: Normal breath sounds. Abdominal:      General: Bowel sounds are normal.      Palpations: Abdomen is soft. Musculoskeletal:         General: Normal range of motion. Right lower leg: No edema. Left lower leg: No edema. Skin:     General: Skin is warm and dry. Neurological:      General: No focal deficit present. Mental Status: He is alert and oriented to person, place, and time. Mental status is at baseline. Psychiatric:         Mood and Affect: Mood normal.         Behavior: Behavior normal.         Thought Content:  Thought content normal.         Judgment: Judgment normal.       LABORATORY RESULTS:  Lab Results   Component Value Date    K 4.6 04/24/2023     (H) 04/24/2023    CO2 25 04/24/2023    BUN 29 (H) 05/09/2023    CREATININE 1.38 (H) 05/16/2023    GLUF 107 (H) 04/24/2023    CALCIUM 8.9 04/24/2023    AST 32 04/24/2023    ALT 42 04/24/2023    ALKPHOS 81 04/24/2023    EGFR 46 05/09/2023     Lab Results   Component Value Date    WBC 6.39 04/24/2023    HGB 13.0 04/24/2023    HCT 39.7 04/24/2023    MCV 89 04/24/2023     04/24/2023     Lab Results   Component Value Date    CALCIUM 8.9 04/24/2023

## 2023-08-08 NOTE — PATIENT INSTRUCTIONS
We will do blood tests and urine tests for further evaluation of kidney function. Your blood pressure is currently above goal in the office but better controlled at home. Please continue to monitor your blood pressure at home. We are not making any changes to your blood pressure medications. We will reach out to your PCP regarding alternatives to omeprazole. We will bring you back to nephrology office in 4 months.

## 2023-08-13 ENCOUNTER — HOSPITAL ENCOUNTER (OUTPATIENT)
Dept: ULTRASOUND IMAGING | Facility: HOSPITAL | Age: 68
Discharge: HOME | End: 2023-08-13
Attending: INTERNAL MEDICINE
Payer: COMMERCIAL

## 2023-08-13 DIAGNOSIS — R94.4 DECREASED CALCULATED GFR: ICD-10-CM

## 2023-08-13 DIAGNOSIS — N18.2 STAGE 2 CHRONIC KIDNEY DISEASE: ICD-10-CM

## 2023-08-13 DIAGNOSIS — R79.89 ELEVATED SERUM CREATININE: ICD-10-CM

## 2023-08-13 DIAGNOSIS — I12.9 BENIGN HYPERTENSION WITH CKD (CHRONIC KIDNEY DISEASE), STAGE II: ICD-10-CM

## 2023-08-13 DIAGNOSIS — N18.2 BENIGN HYPERTENSION WITH CKD (CHRONIC KIDNEY DISEASE), STAGE II: ICD-10-CM

## 2023-08-13 PROCEDURE — 76775 US EXAM ABDO BACK WALL LIM: CPT

## 2023-08-26 DIAGNOSIS — N28.1 BILATERAL RENAL CYSTS: Primary | ICD-10-CM

## 2023-09-26 ENCOUNTER — APPOINTMENT (OUTPATIENT)
Dept: LAB | Facility: AMBULARY SURGERY CENTER | Age: 68
End: 2023-09-26
Payer: COMMERCIAL

## 2023-09-26 LAB — PSA SERPL-MCNC: 1.47 NG/ML (ref 0–4)

## 2023-10-03 ENCOUNTER — OFFICE VISIT (OUTPATIENT)
Dept: UROLOGY | Facility: CLINIC | Age: 68
End: 2023-10-03
Payer: COMMERCIAL

## 2023-10-03 VITALS
HEIGHT: 66 IN | OXYGEN SATURATION: 98 % | BODY MASS INDEX: 30.53 KG/M2 | WEIGHT: 190 LBS | DIASTOLIC BLOOD PRESSURE: 82 MMHG | HEART RATE: 78 BPM | SYSTOLIC BLOOD PRESSURE: 132 MMHG | RESPIRATION RATE: 18 BRPM

## 2023-10-03 DIAGNOSIS — Z12.5 SCREENING FOR PROSTATE CANCER: Primary | ICD-10-CM

## 2023-10-03 LAB — POST-VOID RESIDUAL VOLUME, ML POC: 116 ML

## 2023-10-03 PROCEDURE — 99213 OFFICE O/P EST LOW 20 MIN: CPT | Performed by: PHYSICIAN ASSISTANT

## 2023-10-03 PROCEDURE — 51798 US URINE CAPACITY MEASURE: CPT | Performed by: PHYSICIAN ASSISTANT

## 2023-10-03 RX ORDER — FAMOTIDINE 20 MG/1
TABLET, FILM COATED ORAL
COMMUNITY
Start: 2023-08-16

## 2023-10-03 NOTE — PROGRESS NOTES
1. Screening for prostate cancer  POCT Measure PVR              Assessment and plan:       1. BPH s/p Urolift 2/24/23  -Patient doing excellently after UroLift  -Alpha blockade has been discontinued  -Conservative measures reviewed    2. Prostate Cancer screening  - PSA 1.47  (9/26/23)    Patient continues to do excellently at this time. He will continue annual prostate cancer screening with PCP  F/u with urology OLGA Garcia PA-C      Chief Complaint     Urolift follw up    History of Present Illness     Maximo Bailon is a 76 y.o. presenting for follow up. Patient previously followed by Dr. Bacilio Ford. Longstanding history of BPH with lower urinary tract symptoms. Has been on tamsulosin monotherapy for period of time. Has some mild lower urinary tract symptoms overall comfortable with his urination. Denies any dysuria, gross hematuria, incontinence, or infections. CT urogram (05/04/2019) a negative for any urinary tract abnormalities. Prostatomegaly was noted. Renal US (8/13/23) small b/l renal cysts, left renal cyst with thin septation. Cystoscopy 1/5/23 revealing pure bilobar hyperplasia, high prostatic fossa. S/p urolift (2/24/23) with Dr. Ryan Cuevas. Patient has since discontinued his tamsulosin. He is overall very happy with the results of his UroLift  Endorses a strong stream and good urinary results. No adverse side effects at this time. Patient denies any history of  surgical manipulation or  malignancies. PSA 1.47  (9/26/23)    Uroflow  negligible d/t small voided volume.    PVR 116mL    AUA SYMPTOM SCORE      Flowsheet Row Most Recent Value   AUA SYMPTOM SCORE    How often have you had a sensation of not emptying your bladder completely after you finished urinating? 1 (P)     How often have you had to urinate again less than two hours after you finished urinating? 1 (P)     How often have you found you stopped and started again several times when you urinate? 0 (P)     How often have you found it difficult to postpone urination? 2 (P)     How often have you had a weak urinary stream? 0 (P)     How often have you had to push or strain to begin urination? 0 (P)     How many times did you most typically get up to urinate from the time you went to bed at night until the time you got up in the morning? 1 (P)     Quality of Life: If you were to spend the rest of your life with your urinary condition just the way it is now, how would you feel about that? 0 (P)     AUA SYMPTOM SCORE 5 (P)                 Review of Systems     Review of Systems   Constitutional: Negative for activity change and fatigue. HENT: Negative for congestion. Eyes: Negative for visual disturbance. Respiratory: Negative for shortness of breath and wheezing. Cardiovascular: Negative for chest pain and leg swelling. Gastrointestinal: Negative for abdominal pain. Endocrine: Negative for polyuria. Genitourinary: Negative for dysuria, flank pain, hematuria and urgency. Musculoskeletal: Negative for back pain. Allergic/Immunologic: Negative for immunocompromised state. Neurological: Negative for dizziness and numbness. Psychiatric/Behavioral: Negative for dysphoric mood. All other systems reviewed and are negative.     AUA SYMPTOM SCORE      Flowsheet Row Most Recent Value   AUA SYMPTOM SCORE    How often have you had a sensation of not emptying your bladder completely after you finished urinating? 1 (P)     How often have you had to urinate again less than two hours after you finished urinating? 1 (P)     How often have you found you stopped and started again several times when you urinate? 0 (P)     How often have you found it difficult to postpone urination? 0 (P)     How often have you had a weak urinary stream? 1 (P)     How often have you had to push or strain to begin urination? 0 (P)     How many times did you most typically get up to urinate from the time you went to bed at night until the time you got up in the morning? 2 (P)     Quality of Life: If you were to spend the rest of your life with your urinary condition just the way it is now, how would you feel about that? 2 (P)     AUA SYMPTOM SCORE 5 (P)               Allergies     No Known Allergies    Physical Exam       Physical Exam  Constitutional:       General: He is not in acute distress. Appearance: He is well-developed. HENT:      Head: Normocephalic and atraumatic. Cardiovascular:      Comments: Negative lower extremity edema  Pulmonary:      Effort: Pulmonary effort is normal.      Breath sounds: Normal breath sounds. Abdominal:      Palpations: Abdomen is soft. Musculoskeletal:         General: Normal range of motion. Cervical back: Normal range of motion. Skin:     General: Skin is warm. Neurological:      Mental Status: He is alert and oriented to person, place, and time.    Psychiatric:         Behavior: Behavior normal.           Vital Signs  Vitals:    10/03/23 0845   BP: 132/82   BP Location: Left arm   Patient Position: Sitting   Cuff Size: Standard   Pulse: 78   Resp: 18   SpO2: 98%   Weight: 86.2 kg (190 lb)   Height: 5' 6" (1.676 m)           Current Medications       Current Outpatient Medications:     famotidine (PEPCID) 20 mg tablet, , Disp: , Rfl:     lisinopril-hydrochlorothiazide (PRINZIDE,ZESTORETIC) 20-12.5 MG per tablet, daily, Disp: , Rfl:     omeprazole (PriLOSEC) 20 mg delayed release capsule, , Disp: , Rfl:     simvastatin (ZOCOR) 20 mg tablet, , Disp: , Rfl:     VITAMIN D PO, Take 5,000 Units by mouth daily, Disp: , Rfl:       Active Problems     Patient Active Problem List   Diagnosis    Benign prostatic hyperplasia with lower urinary tract symptoms         Past Medical History     Past Medical History:   Diagnosis Date    BPH (benign prostatic hyperplasia)     GERD (gastroesophageal reflux disease)     Hyperlipidemia     Hypertension          Surgical History     Past Surgical History:   Procedure Laterality Date    COLONOSCOPY      CYSTOSCOPY      [place] CYSTO INSERTION TRANSPROSTATIC IMPLANT SINGLE N/A 2023    Procedure: CYSTOSCOPY WITH INSERTION UROLIFT;  Surgeon: Tessa Chau MD;  [facility] MA[facility];  Service: Urology    TONSILLECTOMY      when pt was 15yo         Family History     Family History   Problem Relation Age of Onset    Diabetes Mother     Cancer Brother         bladder cancer         Social History     Social History     Social History     Tobacco Use   Smoking Status Former    Packs/day: 1.00    Years: 20.00    Total pack years: 20.00    Types: Cigarettes    Start date: 3/7/1971    Quit date: 3/7/1991    Years since quittin.5   Smokeless Tobacco Never   Tobacco Comments              Pertinent Lab Values     Lab Results   Component Value Date    CREATININE 1.38 (H) 2023       Lab Results   Component Value Date    PSA 1.47 2023    PSA 1.1 02/15/2022       Pertinent Imaging      - n/a    Portions of the record may have been created with voice recognition software. Occasional wrong word or "sound a like" substitutions may have occurred due to the inherent limitations of voice recognition software. Read the chart carefully and recognize, using context, where substitutions have occurred.

## 2023-10-09 ENCOUNTER — APPOINTMENT (OUTPATIENT)
Dept: LAB | Facility: AMBULARY SURGERY CENTER | Age: 68
End: 2023-10-09
Payer: COMMERCIAL

## 2023-10-09 DIAGNOSIS — M10.9 GOUT, UNSPECIFIED CAUSE, UNSPECIFIED CHRONICITY, UNSPECIFIED SITE: ICD-10-CM

## 2023-10-09 LAB
ANION GAP SERPL CALCULATED.3IONS-SCNC: 9 MMOL/L
BUN SERPL-MCNC: 31 MG/DL (ref 5–25)
CALCIUM SERPL-MCNC: 9.6 MG/DL (ref 8.4–10.2)
CHLORIDE SERPL-SCNC: 100 MMOL/L (ref 96–108)
CO2 SERPL-SCNC: 27 MMOL/L (ref 21–32)
CREAT SERPL-MCNC: 1.44 MG/DL (ref 0.6–1.3)
GFR SERPL CREATININE-BSD FRML MDRD: 49 ML/MIN/1.73SQ M
GLUCOSE P FAST SERPL-MCNC: 96 MG/DL (ref 65–99)
POTASSIUM SERPL-SCNC: 4.8 MMOL/L (ref 3.5–5.3)
SODIUM SERPL-SCNC: 136 MMOL/L (ref 135–147)
URATE SERPL-MCNC: 8.8 MG/DL (ref 3.5–8.5)

## 2023-10-09 PROCEDURE — 36415 COLL VENOUS BLD VENIPUNCTURE: CPT

## 2023-10-09 PROCEDURE — 80048 BASIC METABOLIC PNL TOTAL CA: CPT

## 2023-10-09 PROCEDURE — 84550 ASSAY OF BLOOD/URIC ACID: CPT

## 2023-10-12 ENCOUNTER — TELEPHONE (OUTPATIENT)
Dept: NEPHROLOGY | Facility: CLINIC | Age: 68
End: 2023-10-12

## 2023-10-12 NOTE — TELEPHONE ENCOUNTER
Left voicemail for the patient reminding him to complete blood and urine tests for nephrology work-up. Advised patient to call back with questions, concerns, or if lab orders need to be sent to an outside lab.

## 2023-10-13 ENCOUNTER — APPOINTMENT (OUTPATIENT)
Dept: LAB | Facility: AMBULARY SURGERY CENTER | Age: 68
End: 2023-10-13
Payer: COMMERCIAL

## 2023-10-13 DIAGNOSIS — R94.4 DECREASED CALCULATED GFR: ICD-10-CM

## 2023-10-13 DIAGNOSIS — N18.2 BENIGN HYPERTENSION WITH CKD (CHRONIC KIDNEY DISEASE), STAGE II: ICD-10-CM

## 2023-10-13 DIAGNOSIS — R79.89 ELEVATED SERUM CREATININE: ICD-10-CM

## 2023-10-13 DIAGNOSIS — N18.2 STAGE 2 CHRONIC KIDNEY DISEASE: ICD-10-CM

## 2023-10-13 DIAGNOSIS — I12.9 BENIGN HYPERTENSION WITH CKD (CHRONIC KIDNEY DISEASE), STAGE II: ICD-10-CM

## 2023-10-13 LAB
ALBUMIN SERPL BCP-MCNC: 4 G/DL (ref 3.5–5)
ANA SER QL IA: NEGATIVE
ANION GAP SERPL CALCULATED.3IONS-SCNC: 6 MMOL/L
BACTERIA UR QL AUTO: ABNORMAL /HPF
BILIRUB UR QL STRIP: NEGATIVE
BUN SERPL-MCNC: 35 MG/DL (ref 5–25)
C3 SERPL-MCNC: 164 MG/DL (ref 87–200)
C4 SERPL-MCNC: 43 MG/DL (ref 19–52)
CALCIUM SERPL-MCNC: 10.3 MG/DL (ref 8.4–10.2)
CHLORIDE SERPL-SCNC: 106 MMOL/L (ref 96–108)
CLARITY UR: CLEAR
CO2 SERPL-SCNC: 28 MMOL/L (ref 21–32)
COLOR UR: ABNORMAL
CREAT SERPL-MCNC: 1.84 MG/DL (ref 0.6–1.3)
CREAT UR-MCNC: 183.5 MG/DL
CREAT UR-MCNC: 183.5 MG/DL
GFR SERPL CREATININE-BSD FRML MDRD: 36 ML/MIN/1.73SQ M
GLUCOSE P FAST SERPL-MCNC: 128 MG/DL (ref 65–99)
GLUCOSE UR STRIP-MCNC: NEGATIVE MG/DL
HBV CORE AB SER QL: NORMAL
HBV CORE IGM SER QL: NORMAL
HBV SURFACE AG SER QL: NORMAL
HCV AB SER QL: NORMAL
HGB UR QL STRIP.AUTO: NEGATIVE
HYALINE CASTS #/AREA URNS LPF: ABNORMAL /LPF
KETONES UR STRIP-MCNC: NEGATIVE MG/DL
LEUKOCYTE ESTERASE UR QL STRIP: NEGATIVE
MICROALBUMIN UR-MCNC: 55.2 MG/L
MICROALBUMIN/CREAT 24H UR: 30 MG/G CREATININE (ref 0–30)
MUCOUS THREADS UR QL AUTO: ABNORMAL
NITRITE UR QL STRIP: NEGATIVE
NON-SQ EPI CELLS URNS QL MICRO: ABNORMAL /HPF
PH UR STRIP.AUTO: 5.5 [PH]
PHOSPHATE SERPL-MCNC: 2.7 MG/DL (ref 2.3–4.1)
POTASSIUM SERPL-SCNC: 4.1 MMOL/L (ref 3.5–5.3)
PROT UR STRIP-MCNC: ABNORMAL MG/DL
PROT UR-MCNC: 18 MG/DL
PROT/CREAT UR: 0.1 MG/G{CREAT} (ref 0–0.1)
PTH-INTACT SERPL-MCNC: 17.9 PG/ML (ref 12–88)
RBC #/AREA URNS AUTO: ABNORMAL /HPF
SODIUM SERPL-SCNC: 140 MMOL/L (ref 135–147)
SP GR UR STRIP.AUTO: 1.02 (ref 1–1.03)
UROBILINOGEN UR STRIP-ACNC: <2 MG/DL
WBC #/AREA URNS AUTO: ABNORMAL /HPF

## 2023-10-13 PROCEDURE — 83970 ASSAY OF PARATHORMONE: CPT

## 2023-10-13 PROCEDURE — 87340 HEPATITIS B SURFACE AG IA: CPT

## 2023-10-13 PROCEDURE — 86704 HEP B CORE ANTIBODY TOTAL: CPT

## 2023-10-13 PROCEDURE — 84165 PROTEIN E-PHORESIS SERUM: CPT

## 2023-10-13 PROCEDURE — 86225 DNA ANTIBODY NATIVE: CPT

## 2023-10-13 PROCEDURE — 86160 COMPLEMENT ANTIGEN: CPT

## 2023-10-13 PROCEDURE — 80069 RENAL FUNCTION PANEL: CPT

## 2023-10-13 PROCEDURE — 86705 HEP B CORE ANTIBODY IGM: CPT

## 2023-10-13 PROCEDURE — 82610 CYSTATIN C: CPT

## 2023-10-13 PROCEDURE — 86037 ANCA TITER EACH ANTIBODY: CPT

## 2023-10-13 PROCEDURE — 83520 IMMUNOASSAY QUANT NOS NONAB: CPT

## 2023-10-13 PROCEDURE — 86038 ANTINUCLEAR ANTIBODIES: CPT

## 2023-10-13 PROCEDURE — 83521 IG LIGHT CHAINS FREE EACH: CPT

## 2023-10-13 PROCEDURE — 86803 HEPATITIS C AB TEST: CPT

## 2023-10-13 PROCEDURE — 36415 COLL VENOUS BLD VENIPUNCTURE: CPT

## 2023-10-14 LAB
DSDNA AB SER-ACNC: <1 IU/ML (ref 0–9)
KAPPA LC FREE SER-MCNC: 54.7 MG/L (ref 3.3–19.4)
KAPPA LC FREE/LAMBDA FREE SER: 2.3 {RATIO} (ref 0.26–1.65)
LAMBDA LC FREE SERPL-MCNC: 23.8 MG/L (ref 5.7–26.3)

## 2023-10-16 LAB
ALBUMIN SERPL ELPH-MCNC: 3.8 G/DL (ref 3.2–5.1)
ALBUMIN SERPL ELPH-MCNC: 59.3 % (ref 48–70)
ALPHA1 GLOB SERPL ELPH-MCNC: 0.33 G/DL (ref 0.15–0.47)
ALPHA1 GLOB SERPL ELPH-MCNC: 5.1 % (ref 1.8–7)
ALPHA2 GLOB SERPL ELPH-MCNC: 0.81 G/DL (ref 0.42–1.04)
ALPHA2 GLOB SERPL ELPH-MCNC: 12.7 % (ref 5.9–14.9)
BETA GLOB ABNORMAL SERPL ELPH-MCNC: 0.36 G/DL (ref 0.31–0.57)
BETA1 GLOB SERPL ELPH-MCNC: 5.6 % (ref 4.7–7.7)
BETA2 GLOB SERPL ELPH-MCNC: 5.4 % (ref 3.1–7.9)
BETA2+GAMMA GLOB SERPL ELPH-MCNC: 0.35 G/DL (ref 0.2–0.58)
CYSTATIN C SERPL-MCNC: 2.05 MG/L (ref 0.72–1.16)
GAMMA GLOB ABNORMAL SERPL ELPH-MCNC: 0.76 G/DL (ref 0.4–1.66)
GAMMA GLOB SERPL ELPH-MCNC: 11.9 % (ref 6.9–22.3)
GBM AB SER IA-ACNC: <0.2 UNITS (ref 0–0.9)
GFR/BSA.PRED SERPLBLD CYS-BASED-ARV: 29 ML/MIN/1.73
IGG/ALB SER: 1.46 {RATIO} (ref 1.1–1.8)
PROT PATTERN SERPL ELPH-IMP: NORMAL
PROT SERPL-MCNC: 6.4 G/DL (ref 6.4–8.4)

## 2023-10-16 PROCEDURE — 84165 PROTEIN E-PHORESIS SERUM: CPT | Performed by: PATHOLOGY

## 2023-10-17 LAB
C-ANCA TITR SER IF: NORMAL TITER
MYELOPEROXIDASE AB SER IA-ACNC: <0.2 UNITS (ref 0–0.9)
P-ANCA ATYPICAL TITR SER IF: NORMAL TITER
P-ANCA TITR SER IF: NORMAL TITER
PROTEINASE3 AB SER IA-ACNC: <0.2 UNITS (ref 0–0.9)

## 2023-10-18 ENCOUNTER — PATIENT MESSAGE (OUTPATIENT)
Dept: NEPHROLOGY | Facility: CLINIC | Age: 68
End: 2023-10-18

## 2023-10-19 ENCOUNTER — TELEPHONE (OUTPATIENT)
Dept: OTHER | Facility: HOSPITAL | Age: 68
End: 2023-10-19

## 2023-10-19 DIAGNOSIS — N18.31 STAGE 3A CHRONIC KIDNEY DISEASE (HCC): Primary | ICD-10-CM

## 2023-10-19 NOTE — TELEPHONE ENCOUNTER
Discussed with the patient on the phone. Creatinine 1.84 which has been stable since 04/2023. Blood pressure is currently controlled on lisinopril-hydrochlorothiazide combination. Patient is feeling well. No dyspnea. No leg swelling. Patient was advised to continue current medication and increase oral hydration to 60 ounces per day. Repeat BMP in 1 month. No need to hold lisinopril hydrochlorothiazide at this stage. Extensive work-up done which showed no significant proteinuria or hematuria. Serological work-up also returned negative, only mild elevation of serum free light chain ratio which is expected in CKD.

## 2023-11-01 ENCOUNTER — APPOINTMENT (OUTPATIENT)
Dept: LAB | Facility: AMBULARY SURGERY CENTER | Age: 68
End: 2023-11-01
Payer: COMMERCIAL

## 2023-11-01 DIAGNOSIS — M10.9 GOUT, UNSPECIFIED CAUSE, UNSPECIFIED CHRONICITY, UNSPECIFIED SITE: ICD-10-CM

## 2023-11-01 LAB — URATE SERPL-MCNC: 8.6 MG/DL (ref 3.5–8.5)

## 2023-11-01 PROCEDURE — 84550 ASSAY OF BLOOD/URIC ACID: CPT

## 2023-11-01 PROCEDURE — 36415 COLL VENOUS BLD VENIPUNCTURE: CPT

## 2023-11-02 ENCOUNTER — OFFICE VISIT (OUTPATIENT)
Dept: GASTROENTEROLOGY | Facility: CLINIC | Age: 68
End: 2023-11-02
Payer: COMMERCIAL

## 2023-11-02 VITALS
HEART RATE: 66 BPM | WEIGHT: 195 LBS | DIASTOLIC BLOOD PRESSURE: 75 MMHG | SYSTOLIC BLOOD PRESSURE: 134 MMHG | BODY MASS INDEX: 31.34 KG/M2 | HEIGHT: 66 IN

## 2023-11-02 DIAGNOSIS — K62.89 ANORECTAL PAIN: ICD-10-CM

## 2023-11-02 DIAGNOSIS — K64.1 GRADE II HEMORRHOIDS: Primary | ICD-10-CM

## 2023-11-02 PROCEDURE — 99204 OFFICE O/P NEW MOD 45 MIN: CPT | Performed by: INTERNAL MEDICINE

## 2023-11-02 RX ORDER — HYDROCORTISONE 25 MG/G
CREAM TOPICAL 2 TIMES DAILY
Qty: 30 G | Refills: 1 | Status: SHIPPED | OUTPATIENT
Start: 2023-11-02

## 2023-11-02 RX ORDER — FEBUXOSTAT 40 MG/1
TABLET, FILM COATED ORAL
COMMUNITY
Start: 2023-10-09

## 2023-11-02 RX ORDER — DICLOFENAC SODIUM 75 MG/1
TABLET, DELAYED RELEASE ORAL
COMMUNITY
Start: 2023-10-09

## 2023-11-02 NOTE — PROGRESS NOTES
[facility] [address] [facility] Consultation  Steven Bajwa 76 y.o. male MRN: [id number]  Encounter: [id number]          ASSESSMENT AND PLAN:      1. Grade II hemorrhoids  -     hydrocortisone (ANUSOL-HC) 2.5 % rectal cream; Apply topically 2 (two) times a day    2. Anorectal pain    History of some discomfort in the rectal area, no painful defecation or blood in stools, clinically no evidence of acute abdomen is obstruction. Examination today no perianal mass disease tenderness induration, no fissure or significant hemorrhoid felt, digital exam brownish stool no pathology felt within the reach of the digital exam.  Advised him to take some Metamucil which she tries, local cream for 7 days, if symptoms persist then may need colonoscopy for further evaluation. He will call us if not any better. Though his symptoms have already nearly resolved.,    ______________________________________________________________________    HPI:      Patient came in for evaluation of his anorectal symptoms, he felt a small lump there, mild discomfort which seem to have resolved. Does not feel any lump anymore. Denies any blood in stools melena hematochezia. Is fair weight stable denies any chest pain shortness of breath, or trauma. No history of CVA CAD CAD stents pacemaker. Diet medications more than 10 system reviewed. His last colonoscopy 5 years ago, as per patient he will be due in 2 years, I could not find those records. REVIEW OF SYSTEMS:    CONSTITUTIONAL: Denies any fever, chills, rigors, and weight loss. HEENT: No earache or tinnitus. CARDIOVASCULAR: No chest pain or palpitations. RESPIRATORY: Denies any cough, hemoptysis, shortness of breath or dyspnea on exertion. GASTROINTESTINAL: As noted in the History of Present Illness. GENITOURINARY: Denies any hematuria or dysuria. NEUROLOGIC: No dizziness or vertigo. MUSCULOSKELETAL: Denies any joint swellings.   SKIN: Denies skin rashes or itching. ENDOCRINE: Denies excessive thirst. Denies intolerance to heat or cold. PSYCHOSOCIAL: Denies depression or anxiety. Denies any recent memory loss. Historical Information   Past Medical History:   Diagnosis Date   • BPH (benign prostatic hyperplasia)    • GERD (gastroesophageal reflux disease)    • Hyperlipidemia    • Hypertension      Past Surgical History:   Procedure Laterality Date   • COLONOSCOPY     • CYSTOSCOPY     • [place] CYSTO INSERTION TRANSPROSTATIC IMPLANT SINGLE N/A 2023    Procedure: CYSTOSCOPY WITH INSERTION Marlin Alvarado;  Surgeon: Lisa Blanco MD;  Location: [facility] MA[facility] OR;  Service: Urology   • TONSILLECTOMY      when pt was 11yo     Social History   Social History     Substance and Sexual Activity   Alcohol Use Not Currently     Social History     Substance and Sexual Activity   Drug Use Never     Social History     Tobacco Use   Smoking Status Former   • Packs/day: 1.00   • Years: 20.00   • Total pack years: 20.00   • Types: Cigarettes   • Start date: 3/7/1971   • Quit date: 3/7/1991   • Years since quittin.6   Smokeless Tobacco Never   Tobacco Comments          Family History   Problem Relation Age of Onset   • Diabetes Mother    • Cancer Brother         bladder cancer       Meds/Allergies       Current Outpatient Medications:   •  diclofenac (VOLTAREN) 75 mg EC tablet  •  hydrocortisone (ANUSOL-HC) 2.5 % rectal cream  •  lisinopril-hydrochlorothiazide (PRINZIDE,ZESTORETIC) 20-12.5 MG per tablet  •  simvastatin (ZOCOR) 20 mg tablet  •  VITAMIN D PO  •  febuxostat (ULORIC) 40 mg tablet    No Known Allergies        Objective     Blood pressure 134/75, pulse 66, height 5' 6" (1.676 m), weight 88.5 kg (195 lb). Body mass index is 31.47 kg/m². PHYSICAL EXAM:      General Appearance:   Alert, cooperative, no distress   HEENT:   Normocephalic, atraumatic, anicteric.      Neck:  Supple, symmetrical, trachea midline   Lungs:   Clear to auscultation bilaterally; no rales, rhonchi or wheezing; respirations unlabored    Heart[de-identified]   Regular rate and rhythm; no murmur. Abdomen:   Soft, non-tender, non-distended; normal bowel sounds; no masses, no organomegaly    Genitalia:   Deferred    Rectal:   Deferred    Extremities:  No cyanosis, clubbing or edema    Skin:  No jaundice, rashes, or lesions    Lymph nodes:  No palpable cervical lymphadenopathy        Lab Results:   No visits with results within 1 Day(s) from this visit. Latest known visit with results is:   Appointment on 11/01/2023   Component Date Value   • Uric Acid 11/01/2023 8.6 (H)          Radiology Results:   No results found.

## 2023-12-28 ENCOUNTER — APPOINTMENT (OUTPATIENT)
Dept: LAB | Facility: AMBULARY SURGERY CENTER | Age: 68
End: 2023-12-28
Payer: COMMERCIAL

## 2023-12-28 DIAGNOSIS — N18.31 STAGE 3A CHRONIC KIDNEY DISEASE (HCC): ICD-10-CM

## 2023-12-28 LAB
ANION GAP SERPL CALCULATED.3IONS-SCNC: 8 MMOL/L
BUN SERPL-MCNC: 29 MG/DL (ref 5–25)
CALCIUM SERPL-MCNC: 9.3 MG/DL (ref 8.4–10.2)
CHLORIDE SERPL-SCNC: 104 MMOL/L (ref 96–108)
CO2 SERPL-SCNC: 27 MMOL/L (ref 21–32)
CREAT SERPL-MCNC: 1.47 MG/DL (ref 0.6–1.3)
GFR SERPL CREATININE-BSD FRML MDRD: 48 ML/MIN/1.73SQ M
GLUCOSE SERPL-MCNC: 150 MG/DL (ref 65–140)
POTASSIUM SERPL-SCNC: 4.2 MMOL/L (ref 3.5–5.3)
SODIUM SERPL-SCNC: 139 MMOL/L (ref 135–147)

## 2023-12-28 PROCEDURE — 36415 COLL VENOUS BLD VENIPUNCTURE: CPT

## 2023-12-28 PROCEDURE — 80048 BASIC METABOLIC PNL TOTAL CA: CPT

## 2024-01-02 ENCOUNTER — OFFICE VISIT (OUTPATIENT)
Dept: NEPHROLOGY | Facility: CLINIC | Age: 69
End: 2024-01-02
Payer: COMMERCIAL

## 2024-01-02 VITALS
BODY MASS INDEX: 32.33 KG/M2 | SYSTOLIC BLOOD PRESSURE: 132 MMHG | WEIGHT: 201.2 LBS | DIASTOLIC BLOOD PRESSURE: 74 MMHG | HEIGHT: 66 IN | HEART RATE: 71 BPM

## 2024-01-02 DIAGNOSIS — Z86.39 HISTORY OF HYPERCALCEMIA: ICD-10-CM

## 2024-01-02 DIAGNOSIS — N28.1 BILATERAL RENAL CYSTS: ICD-10-CM

## 2024-01-02 DIAGNOSIS — E79.0 HYPERURICEMIA: ICD-10-CM

## 2024-01-02 DIAGNOSIS — Z87.39 HISTORY OF GOUT: ICD-10-CM

## 2024-01-02 DIAGNOSIS — N18.30 BENIGN HYPERTENSION WITH CKD (CHRONIC KIDNEY DISEASE) STAGE III (HCC): ICD-10-CM

## 2024-01-02 DIAGNOSIS — N18.30 STAGE 3 CHRONIC KIDNEY DISEASE, UNSPECIFIED WHETHER STAGE 3A OR 3B CKD (HCC): Primary | ICD-10-CM

## 2024-01-02 DIAGNOSIS — I12.9 BENIGN HYPERTENSION WITH CKD (CHRONIC KIDNEY DISEASE) STAGE III (HCC): ICD-10-CM

## 2024-01-02 PROCEDURE — 99214 OFFICE O/P EST MOD 30 MIN: CPT | Performed by: INTERNAL MEDICINE

## 2024-01-02 RX ORDER — SUCRALFATE 1 G/1
1 TABLET ORAL 2 TIMES DAILY
COMMUNITY
Start: 2023-11-08

## 2024-01-02 RX ORDER — LOSARTAN POTASSIUM 50 MG/1
50 TABLET ORAL 2 TIMES DAILY
Qty: 180 TABLET | Refills: 3 | Status: SHIPPED | OUTPATIENT
Start: 2024-01-02 | End: 2024-12-27

## 2024-01-02 NOTE — PATIENT INSTRUCTIONS
Your kidney function is currently stable at stage 3 kidney disease.  Given your history of gout and elevated uric acid, would recommend discontinuation of lisinopril-hydrochlorothiazide and start losartan instead.  Losartan is a blood pressure medication but also helps decrease uric acid level.  After starting losartan, check blood work in 1 month.  No protein in your urine which is a good sign.  Check blood work and urine test in 6 months before next office visit.  You will need to repeat kidney ultrasound by the end of year 2025 to follow-up on the kidney cysts.

## 2024-01-02 NOTE — PROGRESS NOTES
NEPHROLOGY OFFICE VISIT   Telly Dalton 68 y.o. male MRN: [id number]  1/2/2024    Reason for Visit: CKD    ASSESSMENT and PLAN:  It was a pleasure evaluating your patient in the office today. Thank you for allowing our team to participate in the care of Mr. Telly Dalton. Please do not hesitate to contact our team if further issues/questions shall arise in the interim.     # Chronic Kidney Disease Stage III-A  - Etiology/risk factors for CKD: Hypertension, PPI use, age-related nephron loss  - Baseline Cr: 1.26 in 2019, 1.4-1.8 since 04/2023, most recently 1.47 12/2023  - 24-hour creatinine clearance 63 mL/min 05/2023  - Urinalysis: Trace protein, 1-2 RBC, 2-4 WBC, 10-25 hyaline cast 10/2023  - Proteinuria: No, UACR 30 mg/g, UPCR 100 mg 10/2023  - Imaging: Right kidney 11.1 cm, left kidney 11.1 cm, normal echogenicity and contour  - C3/C4 normal, DARIEL/dsDNA negative, ANCA negative/anti-GBM negative, hep B/hep C negative 10/2023  - SPEP no monoclonal band, serum free light chain ratio mildly elevated at 2.3 in setting of CKD 10/2023   - Discussed risk factor reduction to slow progression of chronic kidney disease  Intensive blood pressure control as below  Lipid control, on Zocor 20 mg daily  Lifestyle modifications (weight loss/exercise)  Avoidance of NSAIDs  Avoidance of PPI     # Bilateral renal cysts  - Previous discussion with radiology and the cysts appeared to be mostly simple and small  - Follow-up kidney ultrasound in 08/2025    # History of BPH  - Status post UroLift 02/2023  - Currently symptom-free and follows with urology     # Hypertension/Volume   - Goal BP <120/80 per KDIGO guidelines   - Home BP: ~ 120/80  - Volume status: Euvolemic  - Status: Blood pressure currently above goal in the office  - Current antihypertensive regimen: Lisinopril-hydrochlorothiazide 20-12.5 mg daily  - Changes: Stop hydrochlorothiazide due to hyperuricemia and switch lisinopril to losartan due to its uricosuric properties  -  Start losartan 50 mg twice daily  - Repeat BMP in 1 month     # Screening for Anemia   - Target Hb: More than 10 g/dL  - Most recent hemoglobin: 13 g/dL     # Electrolytes/Acid Base status   - Electrolytes and acid base status within normal limits     # CKD Mineral and Bone parameters   - Goal Ca 8.5-10 mg/dL, goal Phos 2.7-4.6 mg/dL, goal iPTH 30-70 pg/mL  - PTH 17.9 in setting of mildly elevated calcium of 10.3 10/2023  - Most recent serum calcium 9.3 12/2023  - It is possible that mildly elevated serum calcium was in setting of use of thiazide diuretic  - He will be now off thiazide diuretics because of hyperuricemia and this will also help keep his serum calcium level within normal limits    # Vitamin D deficiency  - Most recent vitamin D level 11.6 04/2023  - Status post ergocalciferol 50,000 units weekly for 12 weeks, currently taking cholecalciferol 5000 units daily  .  # History of gout and hyperuricemia  - Most recent uric acid level 8.6 11/2023  - He is currently on Uloric per PCP  - We will stop hydrochlorothiazide as it can increase uric acid level  - We will switch lisinopril to losartan due to its uricosuric properties     HPI:  Since last visit:  No hospitalizations.  Patient has been doing well except intermittent gout flares in his big toes on both sides.  He takes diclofenac intermittently.  Complains of intermittent dyspnea.  He complains of mild leg swelling.  He denies any trouble with urination.    Telly Dalton is a 68 y.o. male who has history of hypertension for around 10 years.  He is on lisinopril-hydrochlorothiazide combination for that period of time.  He also has history of BPH status post UroLift procedure in 02/2023.  Since UroLift, his urinary symptoms have resolved.    >> Major risk factors for CKD  - Diabetes: No   - Hypertension: Yes for 10 years   - Age ? 55 years: Yes   - Family history of kidney disease: No   - Obesity or metabolic syndrome: Yes      >> Medical history  "evaluation   - Prior kidney disease or dialysis: No   - Incidental hematuria in the past: Yes microscopic hematuria    - Urinary symptoms: Stream is good after urolift   - History of foamy or frothy urine: No  - History of nephrolithiasis: No  - Diseases that share risk factors with CKD: HTN  - Systemic diseases that might affect kidney: No   - History of use of medications that might affect renal function: NSAID's occassionally, PPI for 10 years (now stopped)    PATIENT INSTRUCTIONS:    Patient Instructions   Your kidney function is currently stable at stage 3 kidney disease.  Given your history of gout and elevated uric acid, would recommend discontinuation of lisinopril-hydrochlorothiazide and start losartan instead.  Losartan is a blood pressure medication but also helps decrease uric acid level.  After starting losartan, check blood work in 1 month.  No protein in your urine which is a good sign.  Check blood work and urine test in 6 months before next office visit.  You will need to repeat kidney ultrasound by the end of year 2025 to follow-up on the kidney cysts.      OBJECTIVE:  Current Weight: Weight - Scale: 91.3 kg (201 lb 3.2 oz)  Vitals:    01/02/24 0830   BP: 132/74   BP Location: Left arm   Patient Position: Sitting   Cuff Size: Large   Pulse: 71   Weight: 91.3 kg (201 lb 3.2 oz)   Height: 5' 6\" (1.676 m)    Body mass index is 32.47 kg/m².      REVIEW OF SYSTEMS:    Review of Systems   Constitutional:  Negative for chills and fever.   HENT:  Negative for ear pain and sore throat.    Eyes:  Negative for pain and visual disturbance.   Respiratory:  Positive for shortness of breath.    Cardiovascular:  Positive for leg swelling. Negative for chest pain and palpitations.   Gastrointestinal:  Negative for abdominal pain and vomiting.   Genitourinary:  Negative for dysuria and hematuria.   Musculoskeletal:  Negative for arthralgias and back pain.   Skin:  Negative for color change and rash.   Neurological:  " Negative for seizures and syncope.   All other systems reviewed and are negative.      Past Medical History:   Diagnosis Date    BPH (benign prostatic hyperplasia)     GERD (gastroesophageal reflux disease)     Hyperlipidemia     Hypertension        Past Surgical History:   Procedure Laterality Date    COLONOSCOPY      CYSTOSCOPY      AR CYSTO INSERTION TRANSPROSTATIC IMPLANT SINGLE N/A 2023    Procedure: CYSTOSCOPY WITH INSERTION UROLIFT;  Surgeon: Antonio Jackson MD;  Location: [facility] MAIN OR;  Service: Urology    TONSILLECTOMY      when pt was 11yo       Family History   Problem Relation Age of Onset    Diabetes Mother     Cancer Brother         bladder cancer        Social History     Substance and Sexual Activity   Alcohol Use Not Currently     Social History     Substance and Sexual Activity   Drug Use Never     Social History     Tobacco Use   Smoking Status Former    Current packs/day: 0.00    Average packs/day: 1 pack/day for 20.0 years (20.0 ttl pk-yrs)    Types: Cigarettes    Start date: 3/7/1971    Quit date: 3/7/1991    Years since quittin.8   Smokeless Tobacco Never   Tobacco Comments            PHYSICAL EXAM:      Physical Exam  Constitutional:       Appearance: Normal appearance.   HENT:      Head: Normocephalic and atraumatic.      Mouth/Throat:      Mouth: Mucous membranes are moist.      Pharynx: Oropharynx is clear.   Cardiovascular:      Rate and Rhythm: Normal rate and regular rhythm.      Pulses: Normal pulses.      Heart sounds: Normal heart sounds.   Pulmonary:      Effort: Pulmonary effort is normal.      Breath sounds: Normal breath sounds.   Abdominal:      General: Bowel sounds are normal.      Palpations: Abdomen is soft.   Musculoskeletal:         General: Normal range of motion.      Comments: Mild bilateral lower extremity swelling   Skin:     General: Skin is warm and dry.   Neurological:      General: No focal deficit present.      Mental Status: He is alert and  oriented to person, place, and time. Mental status is at baseline.   Psychiatric:         Mood and Affect: Mood normal.         Behavior: Behavior normal.         Thought Content: Thought content normal.         Judgment: Judgment normal.         Medications:    Current Outpatient Medications:     diclofenac (VOLTAREN) 75 mg EC tablet, TAKE 1 TABLET (75 MG) BY MOUTH TWICE A DAY AS NEEDED, Disp: , Rfl:     febuxostat (ULORIC) 40 mg tablet, Take 40 mg by mouth daily, Disp: , Rfl:     hydrocortisone (ANUSOL-HC) 2.5 % rectal cream, Apply topically 2 (two) times a day, Disp: 30 g, Rfl: 1    losartan (COZAAR) 50 mg tablet, Take 1 tablet (50 mg total) by mouth 2 (two) times a day, Disp: 180 tablet, Rfl: 3    simvastatin (ZOCOR) 20 mg tablet, , Disp: , Rfl:     sucralfate (CARAFATE) 1 g tablet, Take 1 g by mouth 2 (two) times a day, Disp: , Rfl:     VITAMIN D PO, Take 5,000 Units by mouth daily, Disp: , Rfl:     Laboratory Results:  Results from last 7 days   Lab Units 12/28/23  0913   POTASSIUM mmol/L 4.2   CHLORIDE mmol/L 104   CO2 mmol/L 27   BUN mg/dL 29*   CREATININE mg/dL 1.47*   CALCIUM mg/dL 9.3       Results for orders placed or performed in visit on 12/28/23   Basic metabolic panel   Result Value Ref Range    Sodium 139 135 - 147 mmol/L    Potassium 4.2 3.5 - 5.3 mmol/L    Chloride 104 96 - 108 mmol/L    CO2 27 21 - 32 mmol/L    ANION GAP 8 mmol/L    BUN 29 (H) 5 - 25 mg/dL    Creatinine 1.47 (H) 0.60 - 1.30 mg/dL    Glucose 150 (H) 65 - 140 mg/dL    Calcium 9.3 8.4 - 10.2 mg/dL    eGFR 48 ml/min/1.73sq m

## 2024-01-10 ENCOUNTER — APPOINTMENT (OUTPATIENT)
Dept: LAB | Facility: AMBULARY SURGERY CENTER | Age: 69
End: 2024-01-10
Payer: COMMERCIAL

## 2024-01-24 ENCOUNTER — APPOINTMENT (OUTPATIENT)
Dept: RADIOLOGY | Facility: CLINIC | Age: 69
End: 2024-01-24
Payer: COMMERCIAL

## 2024-01-24 ENCOUNTER — OFFICE VISIT (OUTPATIENT)
Age: 69
End: 2024-01-24
Payer: COMMERCIAL

## 2024-01-24 VITALS
HEART RATE: 76 BPM | BODY MASS INDEX: 32.35 KG/M2 | DIASTOLIC BLOOD PRESSURE: 79 MMHG | SYSTOLIC BLOOD PRESSURE: 149 MMHG | WEIGHT: 201.28 LBS | HEIGHT: 66 IN

## 2024-01-24 DIAGNOSIS — M79.671 RIGHT FOOT PAIN: Primary | ICD-10-CM

## 2024-01-24 DIAGNOSIS — M79.672 LEFT FOOT PAIN: ICD-10-CM

## 2024-01-24 DIAGNOSIS — M79.671 RIGHT FOOT PAIN: ICD-10-CM

## 2024-01-24 DIAGNOSIS — M10.371 ACUTE GOUT DUE TO RENAL IMPAIRMENT INVOLVING TOE OF RIGHT FOOT: ICD-10-CM

## 2024-01-24 PROCEDURE — 73630 X-RAY EXAM OF FOOT: CPT

## 2024-01-24 PROCEDURE — 99204 OFFICE O/P NEW MOD 45 MIN: CPT | Performed by: STUDENT IN AN ORGANIZED HEALTH CARE EDUCATION/TRAINING PROGRAM

## 2024-01-24 RX ORDER — COLCHICINE 0.6 MG/1
0.6 TABLET ORAL DAILY
Qty: 5 TABLET | Refills: 1 | Status: SHIPPED | OUTPATIENT
Start: 2024-01-24

## 2024-01-24 NOTE — PROGRESS NOTES
This patient was seen on 1/24/24    My role is Foot , Ankle, and Wound Specialist    ASSESSMENT     Diagnoses and all orders for this visit:    Right foot pain  -     X-ray foot right 3+ views; Future  -     colchicine (COLCRYS) 0.6 mg tablet; Take 1 tablet (0.6 mg total) by mouth daily    Left foot pain  -     X-ray foot left 3+ views; Future    Acute gout due to renal impairment involving toe of right foot  -     colchicine (COLCRYS) 0.6 mg tablet; Take 1 tablet (0.6 mg total) by mouth daily         Problem List Items Addressed This Visit    None  Visit Diagnoses       Right foot pain    -  Primary    Relevant Medications    colchicine (COLCRYS) 0.6 mg tablet    Other Relevant Orders    X-ray foot right 3+ views (Completed)    Left foot pain        Relevant Orders    X-ray foot left 3+ views    Acute gout due to renal impairment involving toe of right foot        Relevant Medications    colchicine (COLCRYS) 0.6 mg tablet          PLAN  -Patient was educated regarding their condition  -Rx colchicine  -Educated patient on lifestyle changes including eating fruits and vegitables more often, staying hydrated, reducing consumption of soda/artificially sweetened products, red meat, shellfish, alcohol. Positive diet influences were also discussed such as adding a glass of cherry juice or green tea daily.  -Return to clinic 2-weeks    -X-ray of right foot from today personally reviewed: No acute osseous abnormality to right foot to suggest fracture.  Soft tissues are normal with no calcifications present.  Hallux abductovalgus deformity noted with IM angle of approximately 12.5, hallux abductus angle of 22, tibial sesamoid position 4.    SUBJECTIVE    Chief Complaint:  Right foot pain     Patient ID: Telly MILLER Krystle     1/24/2024: Telly is a pleasant 68-year-old male who presents today with right foot pain.  He states that he walks a lot and is constantly on his feet for work.  He states that he is wearing work shoes while  "doing this.  He states that this has been bothering him for about a week he states that it hurts with walking as well as laying down.  He states that he has decreased kidney function has been diagnosed with stage III kidney disease.  Last week he was put on a steroid and the swelling went down but since the steroid dosage was over it came right back.  He states that he has taken colchicine recently in the past which has worked to reduce his gout pain.        The following portions of the patient's history were reviewed and updated as appropriate: allergies, current medications, past family history, past medical history, past social history, past surgical history and problem list.    Review of Systems   Constitutional: Negative.    Respiratory: Negative.     Cardiovascular: Negative.    Gastrointestinal: Negative.    Genitourinary: Negative.    Musculoskeletal:  Positive for arthralgias and gait problem.   Skin:  Positive for color change.         OBJECTIVE      /79   Pulse 76   Ht 5' 6\" (1.676 m)   Wt 91.3 kg (201 lb 4.5 oz)   BMI 32.49 kg/m²        Physical Exam  Constitutional:       Appearance: Normal appearance.   HENT:      Head: Normocephalic and atraumatic.   Eyes:      General:         Right eye: No discharge.         Left eye: No discharge.   Cardiovascular:      Rate and Rhythm: Normal rate and regular rhythm.      Pulses:           Dorsalis pedis pulses are 2+ on the right side and 2+ on the left side.        Posterior tibial pulses are 2+ on the right side and 2+ on the left side.   Pulmonary:      Effort: Pulmonary effort is normal.      Breath sounds: Normal breath sounds.   Skin:     General: Skin is warm.      Capillary Refill: Capillary refill takes less than 2 seconds.   Neurological:      Mental Status: He is alert and oriented to person, place, and time.      Sensory: Sensation is intact. No sensory deficit.   Psychiatric:         Mood and Affect: Mood normal.         Vascular:  -DP and " PT pulses intact b/l  -Capillary refill time <2 sec b/l  -Skin temp: Warmer to the right in comparison to the contralateral side    MSK:  -Pain on palpation to the dorsal and medial aspect of the right first MTPJ  -MMT is 5/5 to all muscle compartments of the lower extremity  -Ankle dorsiflexion >10 degrees with knee extended and knee flexed b/l    Neuro:  -Light sensation intact bilaterally  -Protective sensation intact bilaterally    Derm:  -No lesions, abrasions, or open wounds noted  -I note erythema and edema to the right first MTPJ, this is consistent with acute gout

## 2024-01-31 ENCOUNTER — OFFICE VISIT (OUTPATIENT)
Age: 69
End: 2024-01-31
Payer: COMMERCIAL

## 2024-01-31 VITALS
SYSTOLIC BLOOD PRESSURE: 130 MMHG | HEIGHT: 66 IN | HEART RATE: 72 BPM | BODY MASS INDEX: 32.14 KG/M2 | WEIGHT: 200 LBS | DIASTOLIC BLOOD PRESSURE: 78 MMHG

## 2024-01-31 DIAGNOSIS — M10.371 ACUTE GOUT DUE TO RENAL IMPAIRMENT INVOLVING TOE OF RIGHT FOOT: Primary | ICD-10-CM

## 2024-01-31 PROCEDURE — 99212 OFFICE O/P EST SF 10 MIN: CPT | Performed by: STUDENT IN AN ORGANIZED HEALTH CARE EDUCATION/TRAINING PROGRAM

## 2024-02-01 ENCOUNTER — TELEPHONE (OUTPATIENT)
Dept: NEPHROLOGY | Facility: CLINIC | Age: 69
End: 2024-02-01

## 2024-02-01 NOTE — TELEPHONE ENCOUNTER
Pt advised that Dr. Mahmood recommends  Allopurinol 50 mg. QD should be okay.      According to patient, PCP wanted to increase Uloric to 80 mg. QD however his insurance will not cover the increased dose.  They are suggesting Allopurinol.  Pt states PCP would like Dr. Mahmood's opinion regarding Allopurinol.

## 2024-02-01 NOTE — PROGRESS NOTES
This patient was seen on  1/31/24 .    My role is Foot , Ankle, and Wound Specialist    ASSESSMENT     Diagnoses and all orders for this visit:    Acute gout due to renal impairment involving toe of right foot         Problem List Items Addressed This Visit    None  Visit Diagnoses       Acute gout due to renal impairment involving toe of right foot    -  Primary          PLAN  -Telly is doing well today and is approximately 90% better.  -We once again reinforced lifestyle modifications in order to decrease the risk of a second gout attack  -Return to clinic as needed for new or worsening podiatric concerns    SUBJECTIVE    Chief Complaint:  Right foot acute gout     Patient ID: Telly Dalton     1/24/2024: Telly is a pleasant 68-year-old male who presents today with right foot pain.  He states that he walks a lot and is constantly on his feet for work.  He states that he is wearing work shoes while doing this.  He states that this has been bothering him for about a week he states that it hurts with walking as well as laying down.  He states that he has decreased kidney function has been diagnosed with stage III kidney disease.  Last week he was put on a steroid and the swelling went down but since the steroid dosage was over it came right back.  He states that he has taken colchicine recently in the past which has worked to reduce his gout pain.    1/31/2024: Telly states that he is doing much better today.  He states that the pain, swelling, and redness to his right foot has gone down drastically since taking his colchicine.        The following portions of the patient's history were reviewed and updated as appropriate: allergies, current medications, past family history, past medical history, past social history, past surgical history and problem list.    Review of Systems   Constitutional: Negative.    Respiratory: Negative.     Cardiovascular: Negative.    Gastrointestinal: Negative.    Genitourinary: Negative.   "  Musculoskeletal:  Positive for arthralgias and gait problem.   Skin:  Positive for color change.         OBJECTIVE      /78   Pulse 72   Ht 5' 6\" (1.676 m)   Wt 90.7 kg (200 lb)   BMI 32.28 kg/m²        Physical Exam  Constitutional:       Appearance: Normal appearance.   HENT:      Head: Normocephalic and atraumatic.   Eyes:      General:         Right eye: No discharge.         Left eye: No discharge.   Cardiovascular:      Rate and Rhythm: Normal rate and regular rhythm.      Pulses:           Dorsalis pedis pulses are 2+ on the right side and 2+ on the left side.        Posterior tibial pulses are 2+ on the right side and 2+ on the left side.   Pulmonary:      Effort: Pulmonary effort is normal.      Breath sounds: Normal breath sounds.   Skin:     General: Skin is warm.      Capillary Refill: Capillary refill takes less than 2 seconds.   Neurological:      Mental Status: He is alert and oriented to person, place, and time.      Sensory: Sensation is intact. No sensory deficit.   Psychiatric:         Mood and Affect: Mood normal.         Vascular:  -DP and PT pulses intact b/l  -Capillary refill time <2 sec b/l  -Skin temp: Within normal limits today's visit     MSK:  -No pain on palpation noted today  -MMT is 5/5 to all muscle compartments of the lower extremity  -Ankle dorsiflexion >10 degrees with knee extended and knee flexed b/l     Neuro:  -Light sensation intact bilaterally  -Protective sensation intact bilaterally     Derm:  -No lesions, abrasions, or open wounds noted  -No erythema or edema noted today.        "

## 2024-02-06 ENCOUNTER — TELEPHONE (OUTPATIENT)
Dept: NEPHROLOGY | Facility: CLINIC | Age: 69
End: 2024-02-06

## 2024-02-06 ENCOUNTER — APPOINTMENT (OUTPATIENT)
Dept: LAB | Facility: AMBULARY SURGERY CENTER | Age: 69
End: 2024-02-06
Payer: COMMERCIAL

## 2024-02-06 DIAGNOSIS — I12.9 BENIGN HYPERTENSION WITH CKD (CHRONIC KIDNEY DISEASE) STAGE III (HCC): ICD-10-CM

## 2024-02-06 DIAGNOSIS — N18.30 STAGE 3 CHRONIC KIDNEY DISEASE, UNSPECIFIED WHETHER STAGE 3A OR 3B CKD (HCC): ICD-10-CM

## 2024-02-06 DIAGNOSIS — N18.30 BENIGN HYPERTENSION WITH CKD (CHRONIC KIDNEY DISEASE) STAGE III (HCC): ICD-10-CM

## 2024-02-06 LAB
ANION GAP SERPL CALCULATED.3IONS-SCNC: 7 MMOL/L
BUN SERPL-MCNC: 20 MG/DL (ref 5–25)
CALCIUM SERPL-MCNC: 9.9 MG/DL (ref 8.4–10.2)
CHLORIDE SERPL-SCNC: 104 MMOL/L (ref 96–108)
CO2 SERPL-SCNC: 27 MMOL/L (ref 21–32)
CREAT SERPL-MCNC: 1.26 MG/DL (ref 0.6–1.3)
GFR SERPL CREATININE-BSD FRML MDRD: 58 ML/MIN/1.73SQ M
GLUCOSE P FAST SERPL-MCNC: 86 MG/DL (ref 65–99)
POTASSIUM SERPL-SCNC: 4.4 MMOL/L (ref 3.5–5.3)
SODIUM SERPL-SCNC: 138 MMOL/L (ref 135–147)

## 2024-02-06 PROCEDURE — 80048 BASIC METABOLIC PNL TOTAL CA: CPT

## 2024-02-06 PROCEDURE — 36415 COLL VENOUS BLD VENIPUNCTURE: CPT

## 2024-02-06 NOTE — TELEPHONE ENCOUNTER
----- Message from Alex Mahmood MD sent at 2/6/2024  2:42 PM EST -----  Kidney function/number looks stable to improved after starting losartan.  Continue current medication regimen.

## 2024-02-26 ENCOUNTER — APPOINTMENT (OUTPATIENT)
Dept: LAB | Facility: AMBULARY SURGERY CENTER | Age: 69
End: 2024-02-26
Payer: COMMERCIAL

## 2024-02-26 DIAGNOSIS — E55.9 AVITAMINOSIS D: ICD-10-CM

## 2024-02-26 DIAGNOSIS — M10.00 GOUTY NEURITIS (HCC): ICD-10-CM

## 2024-02-26 DIAGNOSIS — R73.09 IMPAIRED GLUCOSE TOLERANCE TEST: ICD-10-CM

## 2024-02-26 DIAGNOSIS — G63 GOUTY NEURITIS (HCC): ICD-10-CM

## 2024-02-26 LAB
25(OH)D3 SERPL-MCNC: 47.1 NG/ML (ref 30–100)
EST. AVERAGE GLUCOSE BLD GHB EST-MCNC: 126 MG/DL
GLUCOSE P FAST SERPL-MCNC: 91 MG/DL (ref 65–99)
HBA1C MFR BLD: 6 %
URATE SERPL-MCNC: 1.9 MG/DL (ref 3.5–8.5)

## 2024-02-26 PROCEDURE — 84550 ASSAY OF BLOOD/URIC ACID: CPT

## 2024-02-26 PROCEDURE — 82306 VITAMIN D 25 HYDROXY: CPT

## 2024-02-26 PROCEDURE — 83036 HEMOGLOBIN GLYCOSYLATED A1C: CPT

## 2024-02-26 PROCEDURE — 36415 COLL VENOUS BLD VENIPUNCTURE: CPT

## 2024-02-26 PROCEDURE — 82947 ASSAY GLUCOSE BLOOD QUANT: CPT

## 2024-05-15 ENCOUNTER — TELEPHONE (OUTPATIENT)
Dept: NEPHROLOGY | Facility: CLINIC | Age: 69
End: 2024-05-15

## 2024-07-03 ENCOUNTER — APPOINTMENT (OUTPATIENT)
Dept: LAB | Facility: AMBULARY SURGERY CENTER | Age: 69
End: 2024-07-03
Payer: COMMERCIAL

## 2024-07-03 ENCOUNTER — TELEPHONE (OUTPATIENT)
Dept: NEPHROLOGY | Facility: CLINIC | Age: 69
End: 2024-07-03

## 2024-07-03 DIAGNOSIS — N18.30 BENIGN HYPERTENSION WITH CKD (CHRONIC KIDNEY DISEASE) STAGE III (HCC): ICD-10-CM

## 2024-07-03 DIAGNOSIS — N18.30 STAGE 3 CHRONIC KIDNEY DISEASE, UNSPECIFIED WHETHER STAGE 3A OR 3B CKD (HCC): ICD-10-CM

## 2024-07-03 DIAGNOSIS — I12.9 BENIGN HYPERTENSION WITH CKD (CHRONIC KIDNEY DISEASE) STAGE III (HCC): ICD-10-CM

## 2024-07-03 LAB
ALBUMIN SERPL BCG-MCNC: 4.2 G/DL (ref 3.5–5)
ANION GAP SERPL CALCULATED.3IONS-SCNC: 5 MMOL/L (ref 4–13)
BACTERIA UR QL AUTO: NORMAL /HPF
BASOPHILS # BLD AUTO: 0.06 THOUSANDS/ÂΜL (ref 0–0.1)
BASOPHILS NFR BLD AUTO: 1 % (ref 0–1)
BILIRUB UR QL STRIP: NEGATIVE
BUN SERPL-MCNC: 20 MG/DL (ref 5–25)
CALCIUM SERPL-MCNC: 9 MG/DL (ref 8.4–10.2)
CHLORIDE SERPL-SCNC: 104 MMOL/L (ref 96–108)
CLARITY UR: CLEAR
CO2 SERPL-SCNC: 27 MMOL/L (ref 21–32)
COLOR UR: NORMAL
CREAT SERPL-MCNC: 1.19 MG/DL (ref 0.6–1.3)
CREAT UR-MCNC: 90.9 MG/DL
CREAT UR-MCNC: 90.9 MG/DL
EOSINOPHIL # BLD AUTO: 0.32 THOUSAND/ÂΜL (ref 0–0.61)
EOSINOPHIL NFR BLD AUTO: 5 % (ref 0–6)
ERYTHROCYTE [DISTWIDTH] IN BLOOD BY AUTOMATED COUNT: 14.3 % (ref 11.6–15.1)
GFR SERPL CREATININE-BSD FRML MDRD: 61 ML/MIN/1.73SQ M
GLUCOSE SERPL-MCNC: 76 MG/DL (ref 65–140)
GLUCOSE UR STRIP-MCNC: NEGATIVE MG/DL
HCT VFR BLD AUTO: 42.7 % (ref 36.5–49.3)
HGB BLD-MCNC: 13.8 G/DL (ref 12–17)
HGB UR QL STRIP.AUTO: NEGATIVE
IMM GRANULOCYTES # BLD AUTO: 0.01 THOUSAND/UL (ref 0–0.2)
IMM GRANULOCYTES NFR BLD AUTO: 0 % (ref 0–2)
KETONES UR STRIP-MCNC: NEGATIVE MG/DL
LEUKOCYTE ESTERASE UR QL STRIP: NEGATIVE
LYMPHOCYTES # BLD AUTO: 1.95 THOUSANDS/ÂΜL (ref 0.6–4.47)
LYMPHOCYTES NFR BLD AUTO: 31 % (ref 14–44)
MCH RBC QN AUTO: 28.4 PG (ref 26.8–34.3)
MCHC RBC AUTO-ENTMCNC: 32.3 G/DL (ref 31.4–37.4)
MCV RBC AUTO: 88 FL (ref 82–98)
MICROALBUMIN UR-MCNC: 8.9 MG/L
MICROALBUMIN/CREAT 24H UR: 10 MG/G CREATININE (ref 0–30)
MONOCYTES # BLD AUTO: 0.82 THOUSAND/ÂΜL (ref 0.17–1.22)
MONOCYTES NFR BLD AUTO: 13 % (ref 4–12)
NEUTROPHILS # BLD AUTO: 3.23 THOUSANDS/ÂΜL (ref 1.85–7.62)
NEUTS SEG NFR BLD AUTO: 50 % (ref 43–75)
NITRITE UR QL STRIP: NEGATIVE
NON-SQ EPI CELLS URNS QL MICRO: NORMAL /HPF
NRBC BLD AUTO-RTO: 0 /100 WBCS
PH UR STRIP.AUTO: 6 [PH]
PHOSPHATE SERPL-MCNC: 2.1 MG/DL (ref 2.3–4.1)
PLATELET # BLD AUTO: 206 THOUSANDS/UL (ref 149–390)
PMV BLD AUTO: 11.4 FL (ref 8.9–12.7)
POTASSIUM SERPL-SCNC: 4.1 MMOL/L (ref 3.5–5.3)
PROT UR STRIP-MCNC: NEGATIVE MG/DL
PROT UR-MCNC: 6 MG/DL
PROT/CREAT UR: 0.07 MG/G{CREAT} (ref 0–0.1)
PTH-INTACT SERPL-MCNC: 54.3 PG/ML (ref 12–88)
RBC # BLD AUTO: 4.86 MILLION/UL (ref 3.88–5.62)
RBC #/AREA URNS AUTO: NORMAL /HPF
SODIUM SERPL-SCNC: 136 MMOL/L (ref 135–147)
SP GR UR STRIP.AUTO: 1.01 (ref 1–1.03)
UROBILINOGEN UR STRIP-ACNC: <2 MG/DL
WBC # BLD AUTO: 6.39 THOUSAND/UL (ref 4.31–10.16)
WBC #/AREA URNS AUTO: NORMAL /HPF

## 2024-07-03 PROCEDURE — 84156 ASSAY OF PROTEIN URINE: CPT

## 2024-07-03 PROCEDURE — 85025 COMPLETE CBC W/AUTO DIFF WBC: CPT

## 2024-07-03 PROCEDURE — 36415 COLL VENOUS BLD VENIPUNCTURE: CPT

## 2024-07-03 PROCEDURE — 82043 UR ALBUMIN QUANTITATIVE: CPT

## 2024-07-03 PROCEDURE — 82570 ASSAY OF URINE CREATININE: CPT

## 2024-07-03 PROCEDURE — 83970 ASSAY OF PARATHORMONE: CPT

## 2024-07-03 PROCEDURE — 81001 URINALYSIS AUTO W/SCOPE: CPT

## 2024-07-03 PROCEDURE — 80069 RENAL FUNCTION PANEL: CPT

## 2024-07-03 NOTE — TELEPHONE ENCOUNTER
----- Message from Alex Mahmood MD sent at 7/3/2024  3:20 PM EDT -----  Kidney function remains stable.  No protein in the urine which is a good sign.  Phosphorus level is slightly lower than normal and would make sure that he is taking vitamin D supplement.  If he is not taking any vitamin D supplements, would recommend starting cholecalciferol 1000 units daily, otherwise continue current dose.  He can increase phosphorus intake in diet such as drinking a serving of milk or eating cheese or or having ice cream.  Will discuss in detail on office visit next month.

## 2024-07-05 ENCOUNTER — NURSE TRIAGE (OUTPATIENT)
Age: 69
End: 2024-07-05

## 2024-07-05 NOTE — TELEPHONE ENCOUNTER
Message left on patient's VM that labs show stable kidney function.  No protein in urine. Phos level low.  Per Dr. Mahmood, asking if patient is taking Vitamin D3.  If so, continue current dose.  If not, to start OTC vitamin D3 1000 Units daily.  Increase phos in diet with serving of milk, cheese or ice cream. Pt has followup visit next month and will be discussed in further detail.    (Asked that pt call back with any questions)

## 2024-07-05 NOTE — TELEPHONE ENCOUNTER
Regarding: lab  results  ----- Message from Liliana BUCHANAN sent at 7/5/2024  1:53 PM EDT -----  Please call patient back for  lab results. July 3, 24

## 2024-07-05 NOTE — TELEPHONE ENCOUNTER
"Patient was updated on lab results. No further questions.  Reason for Disposition   Information only question and nurse able to answer    Answer Assessment - Initial Assessment Questions  1. REASON FOR CALL or QUESTION: \"What is your reason for calling today?\" or \"How can I best help you?\" or \"What question do you have that I can help answer?\"      Lab results    Protocols used: Information Only Call - No Triage-ADULT-[place]    "

## 2024-08-13 ENCOUNTER — OFFICE VISIT (OUTPATIENT)
Dept: NEPHROLOGY | Facility: CLINIC | Age: 69
End: 2024-08-13

## 2024-08-13 VITALS
HEART RATE: 80 BPM | OXYGEN SATURATION: 99 % | HEIGHT: 66 IN | WEIGHT: 208 LBS | SYSTOLIC BLOOD PRESSURE: 152 MMHG | BODY MASS INDEX: 33.43 KG/M2 | DIASTOLIC BLOOD PRESSURE: 73 MMHG

## 2024-08-13 DIAGNOSIS — E83.39 HYPOPHOSPHATEMIA: ICD-10-CM

## 2024-08-13 DIAGNOSIS — N18.30 BENIGN HYPERTENSION WITH CKD (CHRONIC KIDNEY DISEASE) STAGE III (HCC): ICD-10-CM

## 2024-08-13 DIAGNOSIS — N18.30 STAGE 3 CHRONIC KIDNEY DISEASE, UNSPECIFIED WHETHER STAGE 3A OR 3B CKD (HCC): Primary | ICD-10-CM

## 2024-08-13 DIAGNOSIS — Z87.39 HISTORY OF GOUT: ICD-10-CM

## 2024-08-13 DIAGNOSIS — N28.1 BILATERAL RENAL CYSTS: ICD-10-CM

## 2024-08-13 DIAGNOSIS — I12.9 BENIGN HYPERTENSION WITH CKD (CHRONIC KIDNEY DISEASE) STAGE III (HCC): ICD-10-CM

## 2024-08-13 RX ORDER — HYDROCHLOROTHIAZIDE 12.5 MG/1
12.5 TABLET ORAL DAILY
Qty: 30 TABLET | Refills: 3 | Status: SHIPPED | OUTPATIENT
Start: 2024-08-13 | End: 2024-09-12

## 2024-08-13 RX ORDER — ALLOPURINOL 100 MG/1
50 TABLET ORAL DAILY
COMMUNITY

## 2024-08-13 NOTE — PROGRESS NOTES
NEPHROLOGY OFFICE VISIT   Telly Dalton 69 y.o. male MRN: [id number]  8/13/2024    Reason for Visit: CKD    ASSESSMENT and PLAN:  It was a pleasure evaluating your patient in the office today. Thank you for allowing our team to participate in the care of Mr. Telly Dalton. Please do not hesitate to contact our team if further issues/questions shall arise in the interim.     # Chronic Kidney Disease Stage III-A  - Etiology/risk factors for CKD: Hypertension, PPI use, age-related nephron loss  - Baseline Cr: 1.26 in 2019, 1.4-1.8 since 04/2023, most recently 1.19 07/2024  - 24-hour creatinine clearance 63 mL/min 05/2023  - Urinalysis: Negative protein, 1-2 RBC, 1-2 WBC 07/2024  - Proteinuria: No, UACR 10 mg/g, UPCR 70 mg 07/2024  - Imaging: Right kidney 11.1 cm, left kidney 11.1 cm, normal echogenicity and contour 08/2023  - C3/C4 normal, DARIEL/dsDNA negative, ANCA negative/anti-GBM negative, hep B/hep C negative 10/2023  - SPEP no monoclonal band, serum free light chain ratio mildly elevated at 2.3 in setting of CKD 10/2023  - KFRE less than 5% risk of kidney failure needing dialysis in next 5 years  - Discussed risk factor reduction to slow progression of chronic kidney disease  Intensive blood pressure control as below  Lipid control, on Zocor 20 mg daily  Lifestyle modifications (weight loss/exercise)  Avoidance of NSAIDs  Avoidance of PPI     # Bilateral renal cysts  - Previous discussion with radiology and the cysts appeared to be mostly simple and small  - Follow-up kidney ultrasound in 08/2025    # History of BPH  - Status post UroLift 02/2023  - Currently symptom-free and follows with urology     # Hypertension/Volume   - Goal BP <120/80 per KDIGO guidelines   - Home BP: 130-135/70-75  - Volume status: Mild hypervolemia  - Status: Blood pressure currently above goal in the office  - Current antihypertensive regimen: Losartan 50 mg twice daily  - Changes: Add hydrochlorothiazide 12.5 mg daily  - Repeat renal  function panel in 1 month after starting hydrochlorothiazide    # Screening for Anemia   - Target Hb: More than 10 g/dL  - Most recent hemoglobin: 13 g/dL     # Electrolytes/Acid Base status   - Electrolytes and acid base status within normal limits     # CKD Mineral and Bone parameters   - Goal Ca 8.5-10 mg/dL, goal Phos 2.7-4.6 mg/dL, goal iPTH 30-70 pg/mL  - PTH 54.3 07/2024 at goal  - Most recent serum calcium 9.0 07/2024  - It is possible that mildly elevated serum calcium in 10/2023 was in setting of use of thiazide diuretic  - He is now off thiazide diuretics and this has kept calcium level within normal limits  - Most recent serum phosphorus level low at 2.1 07/2024  - 25-hydroxy vitamin D level 47 in 02/2024 discussed increase phosphorus intake  - Continue cholecalciferol 5000 units daily and repeat phosphorus level in 1 month    # History of gout and hyperuricemia  - Most recent uric acid 1.9 in 02/2024 improved from 10.2 in 04/2023  - He is currently allopurinol 50 mg daily per PCP  - On last visit we had stopped hydrochlorothiazide and switched  to losartan from lisinopril due to its uricosuric properties  - Now that serum uric acid level is significantly improved, okay to resume hydrochlorothiazide as above undercover of allopurinol    HPI:  Since last visit: He has been doing well.  He denies dyspnea.  He denies leg swelling.  He denies any trouble with urination.    Telly Dalton is a 69 y.o. male who has history of hypertension for around 10 years.  He is on lisinopril-hydrochlorothiazide combination for that period of time.  He also has history of BPH status post UroLift procedure in 02/2023.  Since UroLift, his urinary symptoms have resolved.    >> Major risk factors for CKD  - Diabetes: No   - Hypertension: Yes for 10 years   - Age ? 55 years: Yes   - Family history of kidney disease: No   - Obesity or metabolic syndrome: Yes      >> Medical history evaluation   - Prior kidney disease or dialysis:  "No   - Incidental hematuria in the past: Yes microscopic hematuria    - Urinary symptoms: Stream is good after urolift   - History of foamy or frothy urine: No  - History of nephrolithiasis: No  - Diseases that share risk factors with CKD: HTN  - Systemic diseases that might affect kidney: No   - History of use of medications that might affect renal function: NSAID's occassionally, PPI for 10 years (now stopped)    PATIENT INSTRUCTIONS:    Patient Instructions   Your kidney function is looking stable at stage III kidney disease (mild kidney disease).  Your blood pressure is currently above goal and would recommend restarting hydrochlorothiazide 12.5 mg daily now that your uric acid level has improved significantly from before.  After starting hydrochlorothiazide, check renal function panel with in 1 month.  Repeat full panel of lab tests including blood tests and urine tests in 6 months.  We will also check uric acid level in 6 months.  Follow-up in the office in 6 months.      OBJECTIVE:  Current Weight: Weight - Scale: 94.3 kg (208 lb)  Vitals:    08/13/24 1225 08/13/24 1236   BP: 161/73 152/73   BP Location: Left arm Left arm   Patient Position: Sitting Sitting   Cuff Size: Large Large   Pulse: 80    SpO2: 99%    Weight: 94.3 kg (208 lb)    Height: 5' 6\" (1.676 m)       Body mass index is 33.57 kg/m².      REVIEW OF SYSTEMS:    Review of Systems   Constitutional:  Negative for chills and fever.   HENT:  Negative for ear pain and sore throat.    Eyes:  Negative for pain and visual disturbance.   Respiratory:  Negative for cough and shortness of breath.    Cardiovascular:  Negative for chest pain and palpitations.   Gastrointestinal:  Negative for abdominal pain and vomiting.   Genitourinary:  Negative for dysuria and hematuria.   Musculoskeletal:  Negative for arthralgias and back pain.   Skin:  Negative for color change and rash.   Neurological:  Negative for seizures and syncope.   All other systems reviewed and " are negative.      Past Medical History:   Diagnosis Date    BPH (benign prostatic hyperplasia)     GERD (gastroesophageal reflux disease)     Gout     Hyperlipidemia     Hypertension        Past Surgical History:   Procedure Laterality Date    COLONOSCOPY      CYSTOSCOPY      [place] CYSTO INSERTION TRANSPROSTATIC IMPLANT SINGLE N/A 2023    Procedure: CYSTOSCOPY WITH INSERTION UROLIFT;  Surgeon: Antonio Jackson MD;  Location: [facility];  Service: Urology    TONSILLECTOMY      when pt was 11yo       Family History   Problem Relation Age of Onset    Diabetes Mother     Cancer Brother         bladder cancer        Social History     Substance and Sexual Activity   Alcohol Use Not Currently     Social History     Substance and Sexual Activity   Drug Use Never     Social History     Tobacco Use   Smoking Status Former    Current packs/day: 0.00    Average packs/day: 1.1 packs/day for 26.7 years (30.0 ttl pk-yrs)    Types: Cigarettes    Start date: 3/7/1971    Quit date: 3/7/1991    Years since quittin.4   Smokeless Tobacco Never   Tobacco Comments            PHYSICAL EXAM:      Physical Exam  Constitutional:       Appearance: Normal appearance.   HENT:      Head: Normocephalic and atraumatic.   Cardiovascular:      Rate and Rhythm: Normal rate and regular rhythm.      Pulses: Normal pulses.      Heart sounds: Normal heart sounds.   Pulmonary:      Effort: Pulmonary effort is normal.      Breath sounds: Normal breath sounds.   Abdominal:      Tenderness: There is no right CVA tenderness or left CVA tenderness.   Musculoskeletal:         General: Normal range of motion.      Right lower leg: Edema present.      Left lower leg: Edema present.   Skin:     General: Skin is warm.   Neurological:      Mental Status: He is alert and oriented to person, place, and time. Mental status is at baseline.   Psychiatric:         Mood and Affect: Mood normal.         Medications:    Current Outpatient Medications:      "allopurinol (ZYLOPRIM) 100 mg tablet, Take 50 mg by mouth daily, Disp: , Rfl:     colchicine (COLCRYS) 0.6 mg tablet, Take 1 tablet (0.6 mg total) by mouth daily (Patient taking differently: Take 0.6 mg by mouth if needed), Disp: 5 tablet, Rfl: 1    hydroCHLOROthiazide 12.5 mg tablet, Take 1 tablet (12.5 mg total) by mouth daily, Disp: 30 tablet, Rfl: 3    losartan (COZAAR) 50 mg tablet, Take 1 tablet (50 mg total) by mouth 2 (two) times a day, Disp: 180 tablet, Rfl: 3    simvastatin (ZOCOR) 20 mg tablet, 20 mg in the morning, Disp: , Rfl:     VITAMIN D PO, Take 5,000 Units by mouth daily, Disp: , Rfl:     diclofenac (VOLTAREN) 75 mg EC tablet, TAKE 1 TABLET (75 MG) BY MOUTH TWICE A DAY AS NEEDED (Patient not taking: Reported on 8/13/2024), Disp: , Rfl:     hydrocortisone (ANUSOL-HC) 2.5 % rectal cream, Apply topically 2 (two) times a day, Disp: 30 g, Rfl: 1    sucralfate (CARAFATE) 1 g tablet, Take 1 g by mouth 2 (two) times a day (Patient not taking: Reported on 8/13/2024), Disp: , Rfl:     Laboratory Results:        Invalid input(s): \"ALBUMIN\"      Results for orders placed or performed in visit on 07/03/24   CBC and differential   Result Value Ref Range    WBC 6.39 4.31 - 10.16 Thousand/uL    RBC 4.86 3.88 - 5.62 Million/uL    Hemoglobin 13.8 12.0 - 17.0 g/dL    Hematocrit 42.7 36.5 - 49.3 %    MCV 88 82 - 98 fL    MCH 28.4 26.8 - 34.3 pg    MCHC 32.3 31.4 - 37.4 g/dL    RDW 14.3 11.6 - 15.1 %    MPV 11.4 8.9 - 12.7 fL    Platelets 206 149 - 390 Thousands/uL    nRBC 0 /100 WBCs    Segmented % 50 43 - 75 %    Immature Grans % 0 0 - 2 %    Lymphocytes % 31 14 - 44 %    Monocytes % 13 (H) 4 - 12 %    Eosinophils Relative 5 0 - 6 %    Basophils Relative 1 0 - 1 %    Absolute Neutrophils 3.23 1.85 - 7.62 Thousands/µL    Absolute Immature Grans 0.01 0.00 - 0.20 Thousand/uL    Absolute Lymphocytes 1.95 0.60 - 4.47 Thousands/µL    Absolute Monocytes 0.82 0.17 - 1.22 Thousand/µL    Eosinophils Absolute 0.32 0.00 - 0.61 " Thousand/µL    Basophils Absolute 0.06 0.00 - 0.10 Thousands/µL   Protein / creatinine ratio, urine   Result Value Ref Range    Creatinine, Ur 90.9 Reference range not established. mg/dL    Protein Urine Random 6 Reference range not established. mg/dL    Prot/Creat Ratio, Ur 0.07 0.00 - 0.10   Albumin / creatinine urine ratio   Result Value Ref Range    Creatinine, Ur 90.9 Reference range not established. mg/dL    Albumin,U,Random 8.9 <20.0 mg/L    Albumin Creat Ratio 10 0 - 30 mg/g creatinine   Renal function panel   Result Value Ref Range    Albumin 4.2 3.5 - 5.0 g/dL    Calcium 9.0 8.4 - 10.2 mg/dL    Phosphorus 2.1 (L) 2.3 - 4.1 mg/dL    Glucose 76 65 - 140 mg/dL    BUN 20 5 - 25 mg/dL    Creatinine 1.19 0.60 - 1.30 mg/dL    Sodium 136 135 - 147 mmol/L    Potassium 4.1 3.5 - 5.3 mmol/L    Chloride 104 96 - 108 mmol/L    CO2 27 21 - 32 mmol/L    ANION GAP 5 4 - 13 mmol/L    eGFR 61 ml/min/1.73sq m   PTH, intact   Result Value Ref Range    PTH 54.3 12.0 - 88.0 pg/mL   Urinalysis with microscopic   Result Value Ref Range    Color, UA Light Yellow     Clarity, UA Clear     Specific Gravity, UA 1.014 1.003 - 1.030    pH, UA 6.0 4.5, 5.0, 5.5, 6.0, 6.5, 7.0, 7.5, 8.0    Leukocytes, UA Negative Negative    Nitrite, UA Negative Negative    Protein, UA Negative Negative mg/dl    Glucose, UA Negative Negative mg/dl    Ketones, UA Negative Negative mg/dl    Urobilinogen, UA <2.0 <2.0 mg/dl mg/dl    Bilirubin, UA Negative Negative    Occult Blood, UA Negative Negative    RBC, UA 1-2 None Seen, 1-2 /hpf    WBC, UA 1-2 None Seen, 1-2 /hpf    Epithelial Cells None Seen None Seen, Occasional /hpf    Bacteria, UA None Seen None Seen, Occasional /hpf

## 2024-08-13 NOTE — PATIENT INSTRUCTIONS
Your kidney function is looking stable at stage III kidney disease (mild kidney disease).  Your blood pressure is currently above goal and would recommend restarting hydrochlorothiazide 12.5 mg daily now that your uric acid level has improved significantly from before.  After starting hydrochlorothiazide, check renal function panel with in 1 month.  Repeat full panel of lab tests including blood tests and urine tests in 6 months.  We will also check uric acid level in 6 months.  Follow-up in the office in 6 months.

## 2024-09-04 DIAGNOSIS — N18.30 BENIGN HYPERTENSION WITH CKD (CHRONIC KIDNEY DISEASE) STAGE III (HCC): ICD-10-CM

## 2024-09-04 DIAGNOSIS — I12.9 BENIGN HYPERTENSION WITH CKD (CHRONIC KIDNEY DISEASE) STAGE III (HCC): ICD-10-CM

## 2024-09-04 DIAGNOSIS — N18.30 STAGE 3 CHRONIC KIDNEY DISEASE, UNSPECIFIED WHETHER STAGE 3A OR 3B CKD (HCC): ICD-10-CM

## 2024-09-04 RX ORDER — HYDROCHLOROTHIAZIDE 12.5 MG/1
12.5 TABLET ORAL DAILY
Qty: 90 TABLET | Refills: 0 | Status: SHIPPED | OUTPATIENT
Start: 2024-09-04

## 2024-09-09 ENCOUNTER — APPOINTMENT (OUTPATIENT)
Dept: LAB | Facility: AMBULARY SURGERY CENTER | Age: 69
End: 2024-09-09
Payer: COMMERCIAL

## 2024-09-09 DIAGNOSIS — E83.39 HYPOPHOSPHATEMIA: ICD-10-CM

## 2024-09-09 DIAGNOSIS — I12.9 BENIGN HYPERTENSION WITH CKD (CHRONIC KIDNEY DISEASE) STAGE III (HCC): ICD-10-CM

## 2024-09-09 DIAGNOSIS — N18.30 BENIGN HYPERTENSION WITH CKD (CHRONIC KIDNEY DISEASE) STAGE III (HCC): ICD-10-CM

## 2024-09-09 DIAGNOSIS — N18.30 STAGE 3 CHRONIC KIDNEY DISEASE, UNSPECIFIED WHETHER STAGE 3A OR 3B CKD (HCC): ICD-10-CM

## 2024-09-09 LAB
ALBUMIN SERPL BCG-MCNC: 4.4 G/DL (ref 3.5–5)
ANION GAP SERPL CALCULATED.3IONS-SCNC: 10 MMOL/L (ref 4–13)
BUN SERPL-MCNC: 20 MG/DL (ref 5–25)
CALCIUM SERPL-MCNC: 9.8 MG/DL (ref 8.4–10.2)
CHLORIDE SERPL-SCNC: 98 MMOL/L (ref 96–108)
CO2 SERPL-SCNC: 30 MMOL/L (ref 21–32)
CREAT SERPL-MCNC: 1.27 MG/DL (ref 0.6–1.3)
GFR SERPL CREATININE-BSD FRML MDRD: 57 ML/MIN/1.73SQ M
GLUCOSE P FAST SERPL-MCNC: 83 MG/DL (ref 65–99)
PHOSPHATE SERPL-MCNC: 3.5 MG/DL (ref 2.3–4.1)
POTASSIUM SERPL-SCNC: 3.9 MMOL/L (ref 3.5–5.3)
SODIUM SERPL-SCNC: 138 MMOL/L (ref 135–147)

## 2024-09-09 PROCEDURE — 36415 COLL VENOUS BLD VENIPUNCTURE: CPT

## 2024-09-09 PROCEDURE — 80069 RENAL FUNCTION PANEL: CPT

## 2024-09-10 ENCOUNTER — TELEPHONE (OUTPATIENT)
Dept: NEPHROLOGY | Facility: CLINIC | Age: 69
End: 2024-09-10

## 2024-09-10 NOTE — TELEPHONE ENCOUNTER
----- Message from Alex Mahmood MD sent at 9/9/2024  3:48 PM EDT -----  Please let the patient know that kidney function has remained stable after initiation of hydrochlorothiazide.  Phosphorus level is now within normal limits.  Please have him send some blood pressure readings from home so that we can look to see if any adjustments need to be made.  Thank you

## 2024-10-15 ENCOUNTER — APPOINTMENT (OUTPATIENT)
Dept: LAB | Facility: AMBULARY SURGERY CENTER | Age: 69
End: 2024-10-15
Payer: COMMERCIAL

## 2024-10-15 DIAGNOSIS — I10 ESSENTIAL HYPERTENSION, MALIGNANT: ICD-10-CM

## 2024-10-15 DIAGNOSIS — N28.1 BILATERAL RENAL CYSTS: ICD-10-CM

## 2024-10-15 LAB
ALBUMIN SERPL BCG-MCNC: 4.3 G/DL (ref 3.5–5)
ALP SERPL-CCNC: 81 U/L (ref 34–104)
ALT SERPL W P-5'-P-CCNC: 27 U/L (ref 7–52)
ANION GAP SERPL CALCULATED.3IONS-SCNC: 10 MMOL/L (ref 4–13)
APTT PPP: 30 SECONDS (ref 23–34)
AST SERPL W P-5'-P-CCNC: 29 U/L (ref 13–39)
BASOPHILS # BLD AUTO: 0.09 THOUSANDS/ΜL (ref 0–0.1)
BASOPHILS NFR BLD AUTO: 1 % (ref 0–1)
BILIRUB SERPL-MCNC: 0.64 MG/DL (ref 0.2–1)
BUN SERPL-MCNC: 19 MG/DL (ref 5–25)
CALCIUM SERPL-MCNC: 9.4 MG/DL (ref 8.4–10.2)
CHLORIDE SERPL-SCNC: 101 MMOL/L (ref 96–108)
CHOLEST SERPL-MCNC: 161 MG/DL
CO2 SERPL-SCNC: 27 MMOL/L (ref 21–32)
CREAT SERPL-MCNC: 1.23 MG/DL (ref 0.6–1.3)
EOSINOPHIL # BLD AUTO: 0.45 THOUSAND/ΜL (ref 0–0.61)
EOSINOPHIL NFR BLD AUTO: 6 % (ref 0–6)
ERYTHROCYTE [DISTWIDTH] IN BLOOD BY AUTOMATED COUNT: 14.1 % (ref 11.6–15.1)
GFR SERPL CREATININE-BSD FRML MDRD: 59 ML/MIN/1.73SQ M
GLUCOSE P FAST SERPL-MCNC: 101 MG/DL (ref 65–99)
HCT VFR BLD AUTO: 41.2 % (ref 36.5–49.3)
HDLC SERPL-MCNC: 34 MG/DL
HGB BLD-MCNC: 13.6 G/DL (ref 12–17)
IMM GRANULOCYTES # BLD AUTO: 0.03 THOUSAND/UL (ref 0–0.2)
IMM GRANULOCYTES NFR BLD AUTO: 0 % (ref 0–2)
INR PPP: 0.97 (ref 0.85–1.19)
IRON SERPL-MCNC: 88 UG/DL (ref 50–212)
LDLC SERPL CALC-MCNC: 88 MG/DL (ref 0–100)
LYMPHOCYTES # BLD AUTO: 2.38 THOUSANDS/ΜL (ref 0.6–4.47)
LYMPHOCYTES NFR BLD AUTO: 32 % (ref 14–44)
MCH RBC QN AUTO: 28.8 PG (ref 26.8–34.3)
MCHC RBC AUTO-ENTMCNC: 33 G/DL (ref 31.4–37.4)
MCV RBC AUTO: 87 FL (ref 82–98)
MONOCYTES # BLD AUTO: 0.78 THOUSAND/ΜL (ref 0.17–1.22)
MONOCYTES NFR BLD AUTO: 11 % (ref 4–12)
NEUTROPHILS # BLD AUTO: 3.67 THOUSANDS/ΜL (ref 1.85–7.62)
NEUTS SEG NFR BLD AUTO: 50 % (ref 43–75)
NONHDLC SERPL-MCNC: 127 MG/DL
NRBC BLD AUTO-RTO: 0 /100 WBCS
PLATELET # BLD AUTO: 207 THOUSANDS/UL (ref 149–390)
PMV BLD AUTO: 10.7 FL (ref 8.9–12.7)
POTASSIUM SERPL-SCNC: 4.4 MMOL/L (ref 3.5–5.3)
PROT SERPL-MCNC: 7.4 G/DL (ref 6.4–8.4)
PROTHROMBIN TIME: 13.2 SECONDS (ref 12.3–15)
RBC # BLD AUTO: 4.72 MILLION/UL (ref 3.88–5.62)
SODIUM SERPL-SCNC: 138 MMOL/L (ref 135–147)
TRIGL SERPL-MCNC: 195 MG/DL
VIT B12 SERPL-MCNC: 204 PG/ML (ref 180–914)
WBC # BLD AUTO: 7.4 THOUSAND/UL (ref 4.31–10.16)

## 2024-10-15 PROCEDURE — 83540 ASSAY OF IRON: CPT

## 2024-10-15 PROCEDURE — 85025 COMPLETE CBC W/AUTO DIFF WBC: CPT

## 2024-10-15 PROCEDURE — 80053 COMPREHEN METABOLIC PANEL: CPT

## 2024-10-15 PROCEDURE — 85610 PROTHROMBIN TIME: CPT

## 2024-10-15 PROCEDURE — 85730 THROMBOPLASTIN TIME PARTIAL: CPT

## 2024-10-15 PROCEDURE — 80061 LIPID PANEL: CPT

## 2024-10-15 PROCEDURE — 36415 COLL VENOUS BLD VENIPUNCTURE: CPT

## 2024-10-15 PROCEDURE — 82607 VITAMIN B-12: CPT

## 2024-12-11 DIAGNOSIS — I12.9 BENIGN HYPERTENSION WITH CKD (CHRONIC KIDNEY DISEASE) STAGE III (HCC): ICD-10-CM

## 2024-12-11 DIAGNOSIS — N18.30 BENIGN HYPERTENSION WITH CKD (CHRONIC KIDNEY DISEASE) STAGE III (HCC): ICD-10-CM

## 2024-12-11 DIAGNOSIS — N18.30 STAGE 3 CHRONIC KIDNEY DISEASE, UNSPECIFIED WHETHER STAGE 3A OR 3B CKD (HCC): ICD-10-CM

## 2024-12-11 RX ORDER — HYDROCHLOROTHIAZIDE 12.5 MG/1
12.5 TABLET ORAL DAILY
Qty: 90 TABLET | Refills: 0 | Status: SHIPPED | OUTPATIENT
Start: 2024-12-11

## 2025-02-10 ENCOUNTER — APPOINTMENT (OUTPATIENT)
Dept: LAB | Facility: AMBULARY SURGERY CENTER | Age: 70
End: 2025-02-10
Payer: COMMERCIAL

## 2025-02-10 DIAGNOSIS — E83.39 HYPOPHOSPHATEMIA: ICD-10-CM

## 2025-02-10 DIAGNOSIS — I12.9 BENIGN HYPERTENSION WITH CKD (CHRONIC KIDNEY DISEASE) STAGE III (HCC): ICD-10-CM

## 2025-02-10 DIAGNOSIS — N18.30 BENIGN HYPERTENSION WITH CKD (CHRONIC KIDNEY DISEASE) STAGE III (HCC): ICD-10-CM

## 2025-02-10 DIAGNOSIS — N18.30 STAGE 3 CHRONIC KIDNEY DISEASE, UNSPECIFIED WHETHER STAGE 3A OR 3B CKD (HCC): ICD-10-CM

## 2025-02-10 DIAGNOSIS — Z87.39 HISTORY OF GOUT: ICD-10-CM

## 2025-02-10 LAB
ALBUMIN SERPL BCG-MCNC: 4.6 G/DL (ref 3.5–5)
ANION GAP SERPL CALCULATED.3IONS-SCNC: 7 MMOL/L (ref 4–13)
BUN SERPL-MCNC: 20 MG/DL (ref 5–25)
CALCIUM SERPL-MCNC: 9.6 MG/DL (ref 8.4–10.2)
CHLORIDE SERPL-SCNC: 103 MMOL/L (ref 96–108)
CO2 SERPL-SCNC: 27 MMOL/L (ref 21–32)
CREAT SERPL-MCNC: 1.22 MG/DL (ref 0.6–1.3)
CREAT UR-MCNC: 131.3 MG/DL
CREAT UR-MCNC: 131.3 MG/DL
GFR SERPL CREATININE-BSD FRML MDRD: 60 ML/MIN/1.73SQ M
GLUCOSE P FAST SERPL-MCNC: 101 MG/DL (ref 65–99)
MICROALBUMIN UR-MCNC: 15.5 MG/L
MICROALBUMIN/CREAT 24H UR: 12 MG/G CREATININE (ref 0–30)
PHOSPHATE SERPL-MCNC: 3.1 MG/DL (ref 2.3–4.1)
POTASSIUM SERPL-SCNC: 4.3 MMOL/L (ref 3.5–5.3)
PROT UR-MCNC: 8.2 MG/DL
PROT/CREAT UR: 0.1 MG/G{CREAT} (ref 0–0.1)
PTH-INTACT SERPL-MCNC: 42.9 PG/ML (ref 12–88)
SODIUM SERPL-SCNC: 137 MMOL/L (ref 135–147)
URATE SERPL-MCNC: 6.7 MG/DL (ref 3.5–8.5)

## 2025-02-10 PROCEDURE — 82043 UR ALBUMIN QUANTITATIVE: CPT

## 2025-02-10 PROCEDURE — 80069 RENAL FUNCTION PANEL: CPT

## 2025-02-10 PROCEDURE — 83970 ASSAY OF PARATHORMONE: CPT

## 2025-02-10 PROCEDURE — 36415 COLL VENOUS BLD VENIPUNCTURE: CPT

## 2025-02-10 PROCEDURE — 84550 ASSAY OF BLOOD/URIC ACID: CPT

## 2025-02-10 PROCEDURE — 82570 ASSAY OF URINE CREATININE: CPT

## 2025-02-10 PROCEDURE — 84156 ASSAY OF PROTEIN URINE: CPT

## 2025-02-25 ENCOUNTER — APPOINTMENT (OUTPATIENT)
Dept: LAB | Facility: AMBULARY SURGERY CENTER | Age: 70
End: 2025-02-25
Payer: COMMERCIAL

## 2025-02-25 ENCOUNTER — OFFICE VISIT (OUTPATIENT)
Dept: NEPHROLOGY | Facility: CLINIC | Age: 70
End: 2025-02-25
Payer: COMMERCIAL

## 2025-02-25 ENCOUNTER — APPOINTMENT (OUTPATIENT)
Dept: LAB | Facility: AMBULARY SURGERY CENTER | Age: 70
End: 2025-02-25

## 2025-02-25 VITALS
BODY MASS INDEX: 33.11 KG/M2 | WEIGHT: 206 LBS | HEIGHT: 66 IN | SYSTOLIC BLOOD PRESSURE: 126 MMHG | HEART RATE: 75 BPM | DIASTOLIC BLOOD PRESSURE: 72 MMHG

## 2025-02-25 DIAGNOSIS — I12.9 BENIGN HYPERTENSION WITH CKD (CHRONIC KIDNEY DISEASE) STAGE III (HCC): ICD-10-CM

## 2025-02-25 DIAGNOSIS — N18.31 STAGE 3A CHRONIC KIDNEY DISEASE (HCC): Primary | ICD-10-CM

## 2025-02-25 DIAGNOSIS — N28.1 BILATERAL RENAL CYSTS: ICD-10-CM

## 2025-02-25 DIAGNOSIS — N18.30 BENIGN HYPERTENSION WITH CKD (CHRONIC KIDNEY DISEASE) STAGE III (HCC): ICD-10-CM

## 2025-02-25 DIAGNOSIS — I49.9 IRREGULAR HEART RHYTHM: ICD-10-CM

## 2025-02-25 DIAGNOSIS — I49.9 VENTRICULAR ARRHYTHMIA: ICD-10-CM

## 2025-02-25 DIAGNOSIS — Z87.39 HISTORY OF GOUT: ICD-10-CM

## 2025-02-25 PROCEDURE — 99214 OFFICE O/P EST MOD 30 MIN: CPT | Performed by: INTERNAL MEDICINE

## 2025-02-25 NOTE — PROGRESS NOTES
Name: Telly Dalton      : 1955      MRN: [id number]  Encounter Provider: Alex Mahmood MD  Encounter Date: 2025   Encounter department: [facility]  :  Assessment & Plan  Stage 3a chronic kidney disease (HCC)  - Etiology/risk factors for CKD: Hypertension, PPI use, age-related nephron loss  - Baseline Cr: 1.26 in 2019, 1.4-1.8 since 2023, most recently 1.22 2025  - 24-hour creatinine clearance 63 mL/min 2023  - Urinalysis: Negative protein, 1-2 RBC, 1-2 WBC 2024  - Proteinuria: No, UACR 12 mg/g, UPCR 100 mg 2025  - Imaging: Right kidney 11.1 cm, left kidney 11.1 cm, normal echogenicity and contour 2023  - C3/C4 normal, DARIEL/dsDNA negative, ANCA negative/anti-GBM negative, hep B/hep C negative 10/2023  - SPEP no monoclonal band, serum free light chain ratio mildly elevated at 2.3 in setting of CKD 10/2023  - KFRE less than 5% risk of kidney failure needing dialysis in next 5 years  - Discussed risk factor reduction to slow progression of chronic kidney disease  Intensive blood pressure control as below  Lipid control, on Zocor 20 mg daily  Lifestyle modifications (weight loss/exercise)  Avoidance of NSAIDs  Avoidance of PPI     # Screening for Anemia   - Target Hb: More than 10 g/dL  - Most recent hemoglobin: 13.6 g/dL 10/2024     # Electrolytes/Acid Base status   - Electrolytes and acid base status within normal limits     # CKD Mineral and Bone parameters   - Goal Ca 8.5-10 mg/dL, goal Phos 2.7-4.6 mg/dL, goal iPTH 30-70 pg/mL  - PTH 42.9 2025  - Most recent serum calcium 9.6 2025  - It is possible that mildly elevated serum calcium in 10/2023 was in setting of use of thiazide diuretic  - Calcium level has remained within normal limits despite continuation of thiazide diuretic  - Most recent serum phosphorus level 3.1 2025  - 25-hydroxy vitamin D level 47 in 2024   - Continue cholecalciferol 5000 units daily   Benign hypertension with CKD  (chronic kidney disease) stage III (HCC)  - Goal BP ~ 120/80 per KDIGO guidelines   - Home BP: 120-130/70-80  - Volume status: Euvolemic  - Status: Blood pressure currently above goal in the office  - Current antihypertensive regimen: Losartan 50 mg twice daily, hydrochlorothiazide 12.5 mg daily  - Changes: No changes  History of gout  - Most recent uric acid 6.7 02/2025 overall improved from 10.2 in 04/2023  - He is currently allopurinol 50 mg daily per PCP  - Uric acid level has increased from last time most likely due to addition of hydrochlorothiazide for blood pressure control  - Continue hydrochlorothiazide undercover of allopurinol  Bilateral renal cysts  - Previous discussion with radiology and the cysts appeared to be mostly simple and small  - Follow-up kidney ultrasound in 08/2025  Irregular heart rhythm  - No prior history  - Previous EKG normal sinus rhythm in 2023  - EKG ordered for further evaluation  - Currently asymptomatic  - Advised to present to ED if he develops any new symptoms      History of Present Illness   HPI  Telly Dalton is a 69 y.o. male     Since last visit: He has been doing well.  He denies dyspnea.  He denies leg swelling.  He denies any trouble with urination.    Telly Dalton is a 69 y.o. male who has history of hypertension for around 10 years.  He is on lisinopril-hydrochlorothiazide combination for that period of time.  He also has history of BPH status post UroLift procedure in 02/2023.  Since UroLift, his urinary symptoms have resolved.     >> Major risk factors for CKD  - Diabetes: No   - Hypertension: Yes for 10 years   - Age >= 55 years: Yes   - Family history of kidney disease: No   - Obesity or metabolic syndrome: Yes      >> Medical history evaluation   - Prior kidney disease or dialysis: No   - Incidental hematuria in the past: Yes microscopic hematuria    - Urinary symptoms: Stream is good after urolift   - History of foamy or frothy urine: No  - History of  nephrolithiasis: No  - Diseases that share risk factors with CKD: HTN  - Systemic diseases that might affect kidney: No   - History of use of medications that might affect renal function: NSAID's occassionally, PPI for 10 years (now stopped)    History obtained from: patient    Review of Systems   Constitutional:  Negative for chills and fever.   HENT:  Negative for ear pain and sore throat.    Eyes:  Negative for pain and visual disturbance.   Respiratory:  Negative for cough and shortness of breath.    Cardiovascular:  Negative for chest pain and palpitations.   Gastrointestinal:  Negative for abdominal pain and vomiting.   Genitourinary:  Negative for dysuria and hematuria.   Musculoskeletal:  Negative for arthralgias and back pain.   Skin:  Negative for color change and rash.   Neurological:  Negative for seizures and syncope.   All other systems reviewed and are negative.    Past Medical History   Past Medical History:   Diagnosis Date    BPH (benign prostatic hyperplasia)     GERD (gastroesophageal reflux disease)     Gout     Hyperlipidemia     Hypertension      Past Surgical History:   Procedure Laterality Date    COLONOSCOPY      CYSTOSCOPY      [place] CYSTO INSERTION TRANSPROSTATIC IMPLANT SINGLE N/A 2/24/2023    Procedure: CYSTOSCOPY WITH INSERTION UROLIFT;  Surgeon: Antonio Jackson MD;  Location: [facility] OR;  Service: Urology    TONSILLECTOMY      when pt was 13yo     Family History   Problem Relation Age of Onset    Diabetes Mother     Cancer Brother         bladder cancer      reports that he quit smoking about 33 years ago. His smoking use included cigarettes. He started smoking about 54 years ago. He has a 30.1 pack-year smoking history. He has never used smokeless tobacco. He reports that he does not currently use alcohol. He reports that he does not use drugs.  Current Outpatient Medications   Medication Instructions    allopurinol (ZYLOPRIM) 50 mg, Daily    colchicine (COLCRYS) 0.6 mg, Oral,  "Daily    diclofenac (VOLTAREN) 75 mg EC tablet TAKE 1 TABLET (75 MG) BY MOUTH TWICE A DAY AS NEEDED    hydroCHLOROthiazide 12.5 mg, Oral, Daily    hydrocortisone (ANUSOL-HC) 2.5 % rectal cream Topical, 2 times daily    losartan (COZAAR) 50 mg, Oral, 2 times daily    simvastatin (ZOCOR) 20 mg, Daily    VITAMIN D PO 5,000 Units, Daily   No Known Allergies      Objective   /72 (BP Location: Left arm, Patient Position: Sitting, Cuff Size: Adult)   Pulse 75   Ht 5' 6\" (1.676 m)   Wt 93.4 kg (206 lb)   BMI 33.25 kg/m²      Physical Exam  Vitals and nursing note reviewed.   Constitutional:       General: He is not in acute distress.     Appearance: He is well-developed.   HENT:      Head: Normocephalic and atraumatic.   Eyes:      Conjunctiva/sclera: Conjunctivae normal.   Cardiovascular:      Rate and Rhythm: Normal rate. Rhythm irregular.      Heart sounds: No murmur heard.  Pulmonary:      Effort: Pulmonary effort is normal. No respiratory distress.      Breath sounds: Normal breath sounds.   Abdominal:      Tenderness: There is no right CVA tenderness or left CVA tenderness.   Musculoskeletal:         General: No swelling.      Cervical back: Neck supple.      Right lower leg: No edema.      Left lower leg: No edema.   Skin:     General: Skin is warm and dry.   Neurological:      Mental Status: He is alert.   Psychiatric:         Mood and Affect: Mood normal.         "

## 2025-02-25 NOTE — PATIENT INSTRUCTIONS
Your kidney function is stable at stage III mild kidney disease.    There is no protein in urine which is a good sign.    Blood pressure is well-controlled.    Repeat lab work in 1 year to follow-up in the office in 1 year.    Heart rhythm is slightly abnormal and would recommend checking EKG for further evaluation.    If you have chest pain or shortness of breath or lightheadedness or dizziness, seek emergent attention in the emergency room.

## 2025-02-28 ENCOUNTER — TELEPHONE (OUTPATIENT)
Dept: NEPHROLOGY | Facility: CLINIC | Age: 70
End: 2025-02-28

## 2025-02-28 DIAGNOSIS — I45.10 RBBB: Primary | ICD-10-CM

## 2025-02-28 NOTE — TELEPHONE ENCOUNTER
EKG reviewed.  Sinus rhythm with sinus arrhythmia.  Right bundle branch block.  This was not present on previous EKG.  He is currently asymptomatic.  Will get routine echocardiogram for further evaluation.  He knows to present to emergency room in setting of new symptoms such as chest pain.

## 2025-03-02 LAB
ATRIAL RATE: 70 BPM
P AXIS: 41 DEGREES
PR INTERVAL: 148 MS
QRS AXIS: -16 DEGREES
QRSD INTERVAL: 120 MS
QT INTERVAL: 408 MS
QTC INTERVAL: 441 MS
T WAVE AXIS: -2 DEGREES
VENTRICULAR RATE: 70 BPM

## 2025-03-02 PROCEDURE — 93010 ELECTROCARDIOGRAM REPORT: CPT | Performed by: INTERNAL MEDICINE

## 2025-03-03 ENCOUNTER — RESULTS FOLLOW-UP (OUTPATIENT)
Dept: NEPHROLOGY | Facility: CLINIC | Age: 70
End: 2025-03-03

## 2025-03-08 DIAGNOSIS — N18.30 STAGE 3 CHRONIC KIDNEY DISEASE, UNSPECIFIED WHETHER STAGE 3A OR 3B CKD (HCC): ICD-10-CM

## 2025-03-08 DIAGNOSIS — N18.30 BENIGN HYPERTENSION WITH CKD (CHRONIC KIDNEY DISEASE) STAGE III (HCC): ICD-10-CM

## 2025-03-08 DIAGNOSIS — I12.9 BENIGN HYPERTENSION WITH CKD (CHRONIC KIDNEY DISEASE) STAGE III (HCC): ICD-10-CM

## 2025-03-10 RX ORDER — HYDROCHLOROTHIAZIDE 12.5 MG/1
12.5 TABLET ORAL DAILY
Qty: 90 TABLET | Refills: 1 | Status: SHIPPED | OUTPATIENT
Start: 2025-03-10

## 2025-03-13 ENCOUNTER — RESULTS FOLLOW-UP (OUTPATIENT)
Dept: OTHER | Facility: HOSPITAL | Age: 70
End: 2025-03-13

## 2025-03-13 ENCOUNTER — HOSPITAL ENCOUNTER (OUTPATIENT)
Dept: NON INVASIVE DIAGNOSTICS | Facility: CLINIC | Age: 70
Discharge: HOME | End: 2025-03-13
Payer: COMMERCIAL

## 2025-03-13 VITALS
DIASTOLIC BLOOD PRESSURE: 72 MMHG | HEART RATE: 75 BPM | WEIGHT: 206 LBS | BODY MASS INDEX: 33.11 KG/M2 | HEIGHT: 66 IN | SYSTOLIC BLOOD PRESSURE: 126 MMHG

## 2025-03-13 DIAGNOSIS — I45.10 RBBB: ICD-10-CM

## 2025-03-13 LAB
AORTIC ROOT: 3.2 CM
ASCENDING AORTA: 3.7 CM
BSA FOR ECHO PROCEDURE: 2.03 M2
E WAVE DECELERATION TIME: 235 MS
E/A RATIO: 1
FRACTIONAL SHORTENING: 32 (ref 28–44)
INTERVENTRICULAR SEPTUM IN DIASTOLE (PARASTERNAL SHORT AXIS VIEW): 1.5 CM
INTERVENTRICULAR SEPTUM: 1.5 CM (ref 0.6–1.1)
LAAS-AP2: 19.3 CM2
LAAS-AP4: 16.5 CM2
LEFT ATRIUM SIZE: 3.5 CM
LEFT ATRIUM VOLUME (MOD BIPLANE): 49 ML
LEFT ATRIUM VOLUME INDEX (MOD BIPLANE): 24.1 ML/M2
LEFT INTERNAL DIMENSION IN SYSTOLE: 2.8 CM (ref 2.1–4)
LEFT VENTRICULAR INTERNAL DIMENSION IN DIASTOLE: 4.1 CM (ref 3.5–6)
LEFT VENTRICULAR POSTERIOR WALL IN END DIASTOLE: 1.1 CM
LEFT VENTRICULAR STROKE VOLUME: 44 ML
LV EF US.2D.A4C+ESTIMATED: 67 %
LVSV (TEICH): 44 ML
MV E'TISSUE VEL-LAT: 11 CM/S
MV E'TISSUE VEL-SEP: 9 CM/S
MV PEAK A VEL: 0.71 M/S
MV PEAK E VEL: 71 CM/S
MV STENOSIS PRESSURE HALF TIME: 68 MS
MV VALVE AREA P 1/2 METHOD: 3.24
RA PRESSURE ESTIMATED: 8 MMHG
RIGHT ATRIUM AREA SYSTOLE A4C: 13.8 CM2
RIGHT VENTRICLE ID DIMENSION: 3.5 CM
RV PSP: 20 MMHG
SL CV LEFT ATRIUM LENGTH A2C: 5.4 CM
SL CV LV EF: 65
SL CV PED ECHO LEFT VENTRICLE DIASTOLIC VOLUME (MOD BIPLANE) 2D: 74 ML
SL CV PED ECHO LEFT VENTRICLE SYSTOLIC VOLUME (MOD BIPLANE) 2D: 30 ML
TR MAX PG: 12 MMHG
TR PEAK VELOCITY: 1.8 M/S
TRICUSPID ANNULAR PLANE SYSTOLIC EXCURSION: 2.2 CM
TRICUSPID VALVE PEAK REGURGITATION VELOCITY: 1.77 M/S

## 2025-03-13 PROCEDURE — 93306 TTE W/DOPPLER COMPLETE: CPT | Performed by: INTERNAL MEDICINE

## 2025-03-13 PROCEDURE — 93306 TTE W/DOPPLER COMPLETE: CPT

## 2025-03-13 NOTE — TELEPHONE ENCOUNTER
Spoke with patient about the following,  he expressed understanding and thanked us for the call:    ----- Message from Alex Mahmood MD sent at 3/13/2025  1:24 PM EDT -----  Please call the patient to let him know that his heart ultrasound is within normal limits.  Both left and right side of heart are functioning properly.  Thank you

## 2025-04-28 ENCOUNTER — TELEPHONE (OUTPATIENT)
Age: 70
End: 2025-04-28

## 2025-04-29 ENCOUNTER — APPOINTMENT (OUTPATIENT)
Dept: LAB | Facility: AMBULARY SURGERY CENTER | Age: 70
End: 2025-04-29
Attending: NEUROMUSCULOSKELETAL MEDICINE & OMM
Payer: COMMERCIAL

## 2025-04-29 DIAGNOSIS — M10.9 GOUT, UNSPECIFIED CAUSE, UNSPECIFIED CHRONICITY, UNSPECIFIED SITE: ICD-10-CM

## 2025-04-29 LAB
ALBUMIN SERPL BCG-MCNC: 4.6 G/DL (ref 3.5–5)
ALP SERPL-CCNC: 74 U/L (ref 34–104)
ALT SERPL W P-5'-P-CCNC: 39 U/L (ref 7–52)
ANION GAP SERPL CALCULATED.3IONS-SCNC: 11 MMOL/L (ref 4–13)
AST SERPL W P-5'-P-CCNC: 35 U/L (ref 13–39)
BILIRUB SERPL-MCNC: 0.95 MG/DL (ref 0.2–1)
BNP SERPL-MCNC: 26 PG/ML (ref 0–100)
BUN SERPL-MCNC: 22 MG/DL (ref 5–25)
CALCIUM SERPL-MCNC: 9.6 MG/DL (ref 8.4–10.2)
CHLORIDE SERPL-SCNC: 104 MMOL/L (ref 96–108)
CHOLEST SERPL-MCNC: 151 MG/DL (ref ?–200)
CO2 SERPL-SCNC: 25 MMOL/L (ref 21–32)
CREAT SERPL-MCNC: 1.2 MG/DL (ref 0.6–1.3)
ERYTHROCYTE [DISTWIDTH] IN BLOOD BY AUTOMATED COUNT: 14 % (ref 11.6–15.1)
GFR SERPL CREATININE-BSD FRML MDRD: 60 ML/MIN/1.73SQ M
GLUCOSE P FAST SERPL-MCNC: 97 MG/DL (ref 65–99)
HCT VFR BLD AUTO: 42.9 % (ref 36.5–49.3)
HDLC SERPL-MCNC: 36 MG/DL
HGB BLD-MCNC: 14.3 G/DL (ref 12–17)
LDLC SERPL CALC-MCNC: 76 MG/DL (ref 0–100)
MCH RBC QN AUTO: 30 PG (ref 26.8–34.3)
MCHC RBC AUTO-ENTMCNC: 33.3 G/DL (ref 31.4–37.4)
MCV RBC AUTO: 90 FL (ref 82–98)
NONHDLC SERPL-MCNC: 115 MG/DL
PLATELET # BLD AUTO: 187 THOUSANDS/UL (ref 149–390)
PMV BLD AUTO: 11 FL (ref 8.9–12.7)
POTASSIUM SERPL-SCNC: 4.6 MMOL/L (ref 3.5–5.3)
PROT SERPL-MCNC: 7.1 G/DL (ref 6.4–8.4)
RBC # BLD AUTO: 4.77 MILLION/UL (ref 3.88–5.62)
SODIUM SERPL-SCNC: 140 MMOL/L (ref 135–147)
TRIGL SERPL-MCNC: 197 MG/DL (ref ?–150)
WBC # BLD AUTO: 7.26 THOUSAND/UL (ref 4.31–10.16)

## 2025-04-29 PROCEDURE — 36415 COLL VENOUS BLD VENIPUNCTURE: CPT

## 2025-04-29 PROCEDURE — 85027 COMPLETE CBC AUTOMATED: CPT

## 2025-04-29 PROCEDURE — 80053 COMPREHEN METABOLIC PANEL: CPT

## 2025-04-29 PROCEDURE — 80061 LIPID PANEL: CPT

## 2025-04-29 PROCEDURE — 83880 ASSAY OF NATRIURETIC PEPTIDE: CPT

## (undated) DEVICE — BAG URINE DRAINAGE 2000ML ANTI RFLX LF

## (undated) DEVICE — STERILE SURGICAL LUBRICANT,  TUBE: Brand: SURGILUBE

## (undated) DEVICE — PACK TUR

## (undated) DEVICE — PREMIUM DRY TRAY LF: Brand: MEDLINE INDUSTRIES, INC.

## (undated) DEVICE — GLOVE SRG BIOGEL 7

## (undated) DEVICE — UROCATCH BAG

## (undated) DEVICE — CHLORHEXIDINE 4PCT 4 OZ

## (undated) DEVICE — CATH FOLEY 20FR 5ML 2 WAY SILICONE ELASTIMER

## (undated) DEVICE — INVIEW CLEAR LEGGINGS: Brand: CONVERTORS